# Patient Record
Sex: FEMALE | Race: WHITE | NOT HISPANIC OR LATINO | Employment: OTHER | URBAN - METROPOLITAN AREA
[De-identification: names, ages, dates, MRNs, and addresses within clinical notes are randomized per-mention and may not be internally consistent; named-entity substitution may affect disease eponyms.]

---

## 2023-03-07 DIAGNOSIS — G47.00 INSOMNIA, UNSPECIFIED TYPE: Primary | ICD-10-CM

## 2023-03-07 RX ORDER — FLUTICASONE PROPIONATE 50 MCG
2 SPRAY, SUSPENSION (ML) NASAL DAILY
COMMUNITY
Start: 2013-04-22 | End: 2023-04-10

## 2023-03-07 RX ORDER — ERGOCALCIFEROL 1.25 MG/1
50000 CAPSULE ORAL
COMMUNITY
Start: 2015-04-16 | End: 2023-07-26

## 2023-03-07 RX ORDER — LEVOTHYROXINE SODIUM 100 UG/1
100 TABLET ORAL DAILY
COMMUNITY
Start: 2013-10-31 | End: 2023-07-07 | Stop reason: SDUPTHER

## 2023-03-07 RX ORDER — ZOLPIDEM TARTRATE 10 MG/1
10 TABLET ORAL NIGHTLY PRN
Qty: 30 TABLET | Refills: 0 | Status: SHIPPED | OUTPATIENT
Start: 2023-03-07

## 2023-03-07 RX ORDER — METOPROLOL SUCCINATE 25 MG/1
25 TABLET, EXTENDED RELEASE ORAL
COMMUNITY
Start: 2022-08-05 | End: 2024-04-15

## 2023-03-07 RX ORDER — VALACYCLOVIR HYDROCHLORIDE 500 MG/1
500 TABLET, FILM COATED ORAL 2 TIMES DAILY
COMMUNITY
Start: 2017-08-29

## 2023-03-07 RX ORDER — PREDNISONE 2.5 MG/1
2.5 TABLET ORAL DAILY
COMMUNITY
Start: 2018-05-17

## 2023-03-07 RX ORDER — AZELASTINE 1 MG/ML
2 SPRAY, METERED NASAL 2 TIMES DAILY
COMMUNITY
Start: 2021-07-15 | End: 2023-03-14 | Stop reason: ALTCHOICE

## 2023-03-07 RX ORDER — PREDNISONE 1 MG/1
1 TABLET ORAL
COMMUNITY
Start: 2022-06-23 | End: 2023-07-07

## 2023-03-07 RX ORDER — ZOLPIDEM TARTRATE 10 MG/1
10 TABLET ORAL NIGHTLY PRN
COMMUNITY
Start: 2013-04-22 | End: 2023-03-07 | Stop reason: SDUPTHER

## 2023-03-07 RX ORDER — ALENDRONATE SODIUM 70 MG/1
70 TABLET ORAL
COMMUNITY
Start: 2017-09-11

## 2023-03-13 PROBLEM — Z87.820: Status: ACTIVE | Noted: 2023-03-13

## 2023-03-13 PROBLEM — H44.23 PATHOLOGIC MYOPIA, BILATERAL: Status: ACTIVE | Noted: 2023-03-13

## 2023-03-13 PROBLEM — J34.89 NASAL DRAINAGE: Status: ACTIVE | Noted: 2023-03-13

## 2023-03-13 PROBLEM — R09.82 POST-NASAL DRIP: Status: ACTIVE | Noted: 2023-03-13

## 2023-03-13 PROBLEM — M81.0 OSTEOPOROSIS WITHOUT CURRENT PATHOLOGICAL FRACTURE: Status: ACTIVE | Noted: 2023-03-13

## 2023-03-13 PROBLEM — H40.003 GLAUCOMA SUSPECT OF BOTH EYES: Status: ACTIVE | Noted: 2023-03-13

## 2023-03-13 PROBLEM — M54.2 NECK PAIN: Status: ACTIVE | Noted: 2023-03-13

## 2023-03-13 PROBLEM — E04.9 THYROID ENLARGEMENT: Status: ACTIVE | Noted: 2023-03-13

## 2023-03-13 PROBLEM — R33.9 URINARY RETENTION: Status: ACTIVE | Noted: 2023-03-13

## 2023-03-13 PROBLEM — R05.9 COUGH: Status: ACTIVE | Noted: 2023-03-13

## 2023-03-13 PROBLEM — H25.13 CATARACT, NUCLEAR SCLEROTIC, BOTH EYES: Status: ACTIVE | Noted: 2023-03-13

## 2023-03-13 PROBLEM — R05.3 CHRONIC COUGH: Status: ACTIVE | Noted: 2023-03-13

## 2023-03-13 PROBLEM — H17.9 LEFT CORNEA SCAR: Status: ACTIVE | Noted: 2023-03-13

## 2023-03-13 PROBLEM — R00.2 PALPITATIONS: Status: ACTIVE | Noted: 2023-03-13

## 2023-03-13 PROBLEM — D12.6 TUBULAR ADENOMA OF COLON: Status: ACTIVE | Noted: 2023-03-13

## 2023-03-13 PROBLEM — M25.69 STIFF BACK: Status: ACTIVE | Noted: 2023-03-13

## 2023-03-13 PROBLEM — K21.9 REFLUX LARYNGITIS: Status: ACTIVE | Noted: 2023-03-13

## 2023-03-13 PROBLEM — H18.603 KERATOCONUS OF BOTH EYES: Status: ACTIVE | Noted: 2023-03-13

## 2023-03-13 PROBLEM — U07.1 COVID-19: Status: ACTIVE | Noted: 2023-03-13

## 2023-03-13 PROBLEM — Z87.898: Status: ACTIVE | Noted: 2023-03-13

## 2023-03-13 PROBLEM — H93.19 TINNITUS: Status: ACTIVE | Noted: 2023-03-13

## 2023-03-13 PROBLEM — J31.0 CHRONIC RHINITIS: Status: ACTIVE | Noted: 2023-03-13

## 2023-03-13 PROBLEM — H52.13 MYOPIA OF BOTH EYES WITH ASTIGMATISM AND PRESBYOPIA: Status: ACTIVE | Noted: 2023-03-13

## 2023-03-13 PROBLEM — R09.A2 GLOBUS SENSATION: Status: ACTIVE | Noted: 2023-03-13

## 2023-03-13 PROBLEM — G47.00 INSOMNIA: Status: ACTIVE | Noted: 2023-03-13

## 2023-03-13 PROBLEM — R69 TRAVEL-RELATED ILLNESS: Status: ACTIVE | Noted: 2023-03-13

## 2023-03-13 PROBLEM — K21.9 LPRD (LARYNGOPHARYNGEAL REFLUX DISEASE): Status: ACTIVE | Noted: 2023-03-13

## 2023-03-13 PROBLEM — J38.00 VOCAL CORD WEAKNESS: Status: ACTIVE | Noted: 2023-03-13

## 2023-03-13 PROBLEM — I49.3 PVC (PREMATURE VENTRICULAR CONTRACTION): Status: ACTIVE | Noted: 2023-03-13

## 2023-03-13 PROBLEM — S89.92XA KNEE INJURY, LEFT, INITIAL ENCOUNTER: Status: ACTIVE | Noted: 2023-03-13

## 2023-03-13 PROBLEM — S39.012A BACK STRAIN: Status: ACTIVE | Noted: 2023-03-13

## 2023-03-13 PROBLEM — R79.89 LOW VITAMIN D LEVEL: Status: ACTIVE | Noted: 2023-03-13

## 2023-03-13 PROBLEM — H52.203 MYOPIA OF BOTH EYES WITH ASTIGMATISM AND PRESBYOPIA: Status: ACTIVE | Noted: 2023-03-13

## 2023-03-13 PROBLEM — R53.83 OTHER FATIGUE: Status: ACTIVE | Noted: 2023-03-13

## 2023-03-13 PROBLEM — J04.0 REFLUX LARYNGITIS: Status: ACTIVE | Noted: 2023-03-13

## 2023-03-13 PROBLEM — A09 TRAVELERS' DIARRHEA: Status: ACTIVE | Noted: 2023-03-13

## 2023-03-13 PROBLEM — I77.810 MILD ASCENDING AORTA DILATATION (CMS-HCC): Status: ACTIVE | Noted: 2023-03-13

## 2023-03-13 PROBLEM — M25.562 LEFT KNEE PAIN: Status: ACTIVE | Noted: 2023-03-13

## 2023-03-13 PROBLEM — R53.83 FATIGUE: Status: ACTIVE | Noted: 2023-03-13

## 2023-03-13 PROBLEM — R04.0 EPISTAXIS: Status: ACTIVE | Noted: 2023-03-13

## 2023-03-13 PROBLEM — J30.9 ALLERGIC RHINITIS: Status: ACTIVE | Noted: 2023-03-13

## 2023-03-13 PROBLEM — E66.3 OVERWEIGHT WITH BODY MASS INDEX (BMI) OF 25 TO 25.9 IN ADULT: Status: ACTIVE | Noted: 2023-03-13

## 2023-03-13 PROBLEM — E78.5 HYPERLIPIDEMIA: Status: ACTIVE | Noted: 2023-03-13

## 2023-03-13 PROBLEM — H52.4 MYOPIA OF BOTH EYES WITH ASTIGMATISM AND PRESBYOPIA: Status: ACTIVE | Noted: 2023-03-13

## 2023-03-13 PROBLEM — R42 DIZZINESS: Status: ACTIVE | Noted: 2023-03-13

## 2023-03-13 PROBLEM — S83.419A MEDIAL COLLATERAL LIGAMENT SPRAIN OF KNEE: Status: ACTIVE | Noted: 2023-03-13

## 2023-03-13 PROBLEM — M79.10 MYALGIA: Status: ACTIVE | Noted: 2023-03-13

## 2023-03-13 PROBLEM — R09.A2 GLOBUS PHARYNGEUS: Status: ACTIVE | Noted: 2023-03-13

## 2023-03-13 PROBLEM — M85.80 OSTEOPENIA: Status: ACTIVE | Noted: 2023-03-13

## 2023-03-13 PROBLEM — G47.09 OTHER INSOMNIA: Status: ACTIVE | Noted: 2023-03-13

## 2023-03-13 PROBLEM — E03.9 HYPOTHYROIDISM: Status: ACTIVE | Noted: 2023-03-13

## 2023-03-13 PROBLEM — L50.0: Status: ACTIVE | Noted: 2023-03-13

## 2023-03-13 PROBLEM — H52.213 IRREGULAR ASTIGMATISM OF BOTH EYES: Status: ACTIVE | Noted: 2023-03-13

## 2023-03-13 PROBLEM — M35.3 POLYMYALGIA RHEUMATICA (MULTI): Status: ACTIVE | Noted: 2023-03-13

## 2023-03-14 ENCOUNTER — TELEMEDICINE (OUTPATIENT)
Dept: PRIMARY CARE | Facility: CLINIC | Age: 68
End: 2023-03-14
Payer: COMMERCIAL

## 2023-03-14 DIAGNOSIS — G47.09 OTHER INSOMNIA: Primary | ICD-10-CM

## 2023-03-14 DIAGNOSIS — G47.00 INSOMNIA, UNSPECIFIED TYPE: ICD-10-CM

## 2023-03-14 DIAGNOSIS — M35.3 POLYMYALGIA RHEUMATICA (MULTI): ICD-10-CM

## 2023-03-14 PROCEDURE — 99213 OFFICE O/P EST LOW 20 MIN: CPT | Performed by: INTERNAL MEDICINE

## 2023-03-14 RX ORDER — ESZOPICLONE 2 MG/1
2 TABLET, FILM COATED ORAL NIGHTLY PRN
Qty: 30 TABLET | Refills: 3 | Status: SHIPPED | OUTPATIENT
Start: 2023-03-14 | End: 2023-10-03 | Stop reason: SDUPTHER

## 2023-03-14 ASSESSMENT — ENCOUNTER SYMPTOMS
FATIGUE: 0
SLEEP DISTURBANCE: 1
CHILLS: 0

## 2023-03-14 NOTE — PROGRESS NOTES
Subjective   Patient ID: Dinora Lawler is a 67 y.o. female.    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  She has been taking zolpidem for few years.  She has tried Lunesta in the past but her insurance stopped covering.  She still has insomnia and is willing to try Lunesta.  Medical history significant for PMR , insomnia, hypothyroidism, osteopenia.    Review of Systems   Constitutional:  Negative for chills and fatigue.   Psychiatric/Behavioral:  Positive for sleep disturbance.        Objective   Physical Exam  Appears well  Assessment/Plan   Diagnoses and all orders for this visit:  Other insomnia  Insomnia, unspecified type  Polymyalgia rheumatica (CMS/HCC)  Chronic insomnia-start Lunesta 2 mg every night  Monitor  Call office for any concerns

## 2023-04-08 DIAGNOSIS — J30.9 ALLERGIC RHINITIS, UNSPECIFIED: ICD-10-CM

## 2023-04-10 RX ORDER — FLUTICASONE PROPIONATE 50 MCG
SPRAY, SUSPENSION (ML) NASAL
Qty: 48 ML | Refills: 2 | Status: SHIPPED | OUTPATIENT
Start: 2023-04-10

## 2023-06-30 DIAGNOSIS — M35.3 POLYMYALGIA RHEUMATICA (MULTI): ICD-10-CM

## 2023-07-07 DIAGNOSIS — E03.8 OTHER SPECIFIED HYPOTHYROIDISM: Primary | ICD-10-CM

## 2023-07-07 RX ORDER — PREDNISONE 1 MG/1
TABLET ORAL
Qty: 270 TABLET | Refills: 0 | Status: SHIPPED | OUTPATIENT
Start: 2023-07-07 | End: 2023-10-03 | Stop reason: SDUPTHER

## 2023-07-10 RX ORDER — LEVOTHYROXINE SODIUM 100 UG/1
100 TABLET ORAL DAILY
Qty: 30 TABLET | Refills: 1 | Status: SHIPPED | OUTPATIENT
Start: 2023-07-10 | End: 2023-08-09

## 2023-07-25 DIAGNOSIS — Z00.00 PREVENTATIVE HEALTH CARE: Primary | ICD-10-CM

## 2023-07-26 RX ORDER — ERGOCALCIFEROL 1.25 MG/1
1 CAPSULE ORAL
Qty: 12 CAPSULE | Refills: 2 | Status: SHIPPED | OUTPATIENT
Start: 2023-07-26

## 2023-07-31 ENCOUNTER — TELEPHONE (OUTPATIENT)
Dept: PRIMARY CARE | Facility: CLINIC | Age: 68
End: 2023-07-31
Payer: COMMERCIAL

## 2023-07-31 DIAGNOSIS — M35.3 POLYMYALGIA RHEUMATICA (MULTI): Primary | ICD-10-CM

## 2023-08-03 ENCOUNTER — LAB (OUTPATIENT)
Dept: LAB | Facility: LAB | Age: 68
End: 2023-08-03
Payer: COMMERCIAL

## 2023-08-03 ENCOUNTER — OFFICE VISIT (OUTPATIENT)
Dept: PRIMARY CARE | Facility: CLINIC | Age: 68
End: 2023-08-03
Payer: COMMERCIAL

## 2023-08-03 VITALS
HEIGHT: 68 IN | SYSTOLIC BLOOD PRESSURE: 104 MMHG | BODY MASS INDEX: 25.61 KG/M2 | WEIGHT: 169 LBS | DIASTOLIC BLOOD PRESSURE: 73 MMHG | TEMPERATURE: 97.2 F

## 2023-08-03 DIAGNOSIS — M35.3 POLYMYALGIA RHEUMATICA (MULTI): ICD-10-CM

## 2023-08-03 DIAGNOSIS — E03.8 OTHER SPECIFIED HYPOTHYROIDISM: ICD-10-CM

## 2023-08-03 DIAGNOSIS — E78.49 OTHER HYPERLIPIDEMIA: ICD-10-CM

## 2023-08-03 DIAGNOSIS — M81.8 OTHER OSTEOPOROSIS WITHOUT CURRENT PATHOLOGICAL FRACTURE: ICD-10-CM

## 2023-08-03 DIAGNOSIS — G47.00 INSOMNIA, UNSPECIFIED TYPE: ICD-10-CM

## 2023-08-03 DIAGNOSIS — Z00.00 ANNUAL PHYSICAL EXAM: Primary | ICD-10-CM

## 2023-08-03 DIAGNOSIS — E08.00 DIABETES MELLITUS DUE TO UNDERLYING CONDITION WITH HYPEROSMOLARITY WITHOUT COMA, WITHOUT LONG-TERM CURRENT USE OF INSULIN (MULTI): ICD-10-CM

## 2023-08-03 PROBLEM — S39.012A BACK STRAIN: Status: RESOLVED | Noted: 2023-03-13 | Resolved: 2023-08-03

## 2023-08-03 PROBLEM — A09 TRAVELERS' DIARRHEA: Status: RESOLVED | Noted: 2023-03-13 | Resolved: 2023-08-03

## 2023-08-03 PROBLEM — R09.82 POST-NASAL DRIP: Status: RESOLVED | Noted: 2023-03-13 | Resolved: 2023-08-03

## 2023-08-03 PROBLEM — U07.1 COVID-19: Status: RESOLVED | Noted: 2023-03-13 | Resolved: 2023-08-03

## 2023-08-03 PROBLEM — R04.0 EPISTAXIS: Status: RESOLVED | Noted: 2023-03-13 | Resolved: 2023-08-03

## 2023-08-03 LAB
ALANINE AMINOTRANSFERASE (SGPT) (U/L) IN SER/PLAS: 12 U/L (ref 7–45)
ALBUMIN (G/DL) IN SER/PLAS: 4.4 G/DL (ref 3.4–5)
ALKALINE PHOSPHATASE (U/L) IN SER/PLAS: 76 U/L (ref 33–136)
ANION GAP IN SER/PLAS: 13 MMOL/L (ref 10–20)
ASPARTATE AMINOTRANSFERASE (SGOT) (U/L) IN SER/PLAS: 17 U/L (ref 9–39)
BILIRUBIN TOTAL (MG/DL) IN SER/PLAS: 0.5 MG/DL (ref 0–1.2)
CALCIUM (MG/DL) IN SER/PLAS: 9.6 MG/DL (ref 8.6–10.6)
CARBON DIOXIDE, TOTAL (MMOL/L) IN SER/PLAS: 28 MMOL/L (ref 21–32)
CHLORIDE (MMOL/L) IN SER/PLAS: 104 MMOL/L (ref 98–107)
CHOLESTEROL (MG/DL) IN SER/PLAS: 214 MG/DL (ref 0–199)
CHOLESTEROL IN HDL (MG/DL) IN SER/PLAS: 65.8 MG/DL
CHOLESTEROL/HDL RATIO: 3.3
CREATININE (MG/DL) IN SER/PLAS: 0.57 MG/DL (ref 0.5–1.05)
ERYTHROCYTE DISTRIBUTION WIDTH (RATIO) BY AUTOMATED COUNT: 13.7 % (ref 11.5–14.5)
ERYTHROCYTE MEAN CORPUSCULAR HEMOGLOBIN CONCENTRATION (G/DL) BY AUTOMATED: 31.4 G/DL (ref 32–36)
ERYTHROCYTE MEAN CORPUSCULAR VOLUME (FL) BY AUTOMATED COUNT: 94 FL (ref 80–100)
ERYTHROCYTES (10*6/UL) IN BLOOD BY AUTOMATED COUNT: 4.28 X10E12/L (ref 4–5.2)
GFR FEMALE: >90 ML/MIN/1.73M2
GLUCOSE (MG/DL) IN SER/PLAS: 84 MG/DL (ref 74–99)
HEMATOCRIT (%) IN BLOOD BY AUTOMATED COUNT: 40.4 % (ref 36–46)
HEMOGLOBIN (G/DL) IN BLOOD: 12.7 G/DL (ref 12–16)
LDL: 130 MG/DL (ref 0–99)
LEUKOCYTES (10*3/UL) IN BLOOD BY AUTOMATED COUNT: 5.2 X10E9/L (ref 4.4–11.3)
NRBC (PER 100 WBCS) BY AUTOMATED COUNT: 0 /100 WBC (ref 0–0)
PLATELETS (10*3/UL) IN BLOOD AUTOMATED COUNT: 292 X10E9/L (ref 150–450)
POTASSIUM (MMOL/L) IN SER/PLAS: 4.3 MMOL/L (ref 3.5–5.3)
PROTEIN TOTAL: 7.2 G/DL (ref 6.4–8.2)
SODIUM (MMOL/L) IN SER/PLAS: 141 MMOL/L (ref 136–145)
THYROTROPIN (MIU/L) IN SER/PLAS BY DETECTION LIMIT <= 0.05 MIU/L: 2.08 MIU/L (ref 0.44–3.98)
TRIGLYCERIDE (MG/DL) IN SER/PLAS: 90 MG/DL (ref 0–149)
UREA NITROGEN (MG/DL) IN SER/PLAS: 13 MG/DL (ref 6–23)
VLDL: 18 MG/DL (ref 0–40)

## 2023-08-03 PROCEDURE — 99397 PER PM REEVAL EST PAT 65+ YR: CPT | Performed by: INTERNAL MEDICINE

## 2023-08-03 PROCEDURE — 80053 COMPREHEN METABOLIC PANEL: CPT

## 2023-08-03 PROCEDURE — 1159F MED LIST DOCD IN RCRD: CPT | Performed by: INTERNAL MEDICINE

## 2023-08-03 PROCEDURE — 3078F DIAST BP <80 MM HG: CPT | Performed by: INTERNAL MEDICINE

## 2023-08-03 PROCEDURE — 1036F TOBACCO NON-USER: CPT | Performed by: INTERNAL MEDICINE

## 2023-08-03 PROCEDURE — 1160F RVW MEDS BY RX/DR IN RCRD: CPT | Performed by: INTERNAL MEDICINE

## 2023-08-03 PROCEDURE — 85027 COMPLETE CBC AUTOMATED: CPT

## 2023-08-03 PROCEDURE — 36415 COLL VENOUS BLD VENIPUNCTURE: CPT

## 2023-08-03 PROCEDURE — 84443 ASSAY THYROID STIM HORMONE: CPT

## 2023-08-03 PROCEDURE — 3074F SYST BP LT 130 MM HG: CPT | Performed by: INTERNAL MEDICINE

## 2023-08-03 PROCEDURE — 80061 LIPID PANEL: CPT

## 2023-08-03 PROCEDURE — 1125F AMNT PAIN NOTED PAIN PRSNT: CPT | Performed by: INTERNAL MEDICINE

## 2023-08-03 ASSESSMENT — ENCOUNTER SYMPTOMS
WHEEZING: 0
DECREASED CONCENTRATION: 0
CHEST TIGHTNESS: 0
SHORTNESS OF BREATH: 0
SORE THROAT: 0
FLANK PAIN: 0
PALPITATIONS: 0
UNEXPECTED WEIGHT CHANGE: 0
NECK PAIN: 0
COUGH: 0
DEPRESSION: 0
ARTHRALGIAS: 0
NERVOUS/ANXIOUS: 0
OCCASIONAL FEELINGS OF UNSTEADINESS: 0
LOSS OF SENSATION IN FEET: 0
APPETITE CHANGE: 0
LIGHT-HEADEDNESS: 0
WEAKNESS: 0
HEADACHES: 0
SLEEP DISTURBANCE: 0
ABDOMINAL PAIN: 0
DYSURIA: 0
FREQUENCY: 0
CHILLS: 0
BACK PAIN: 0
DIFFICULTY URINATING: 0
ACTIVITY CHANGE: 0
BLOOD IN STOOL: 0
DIZZINESS: 0

## 2023-08-03 NOTE — PROGRESS NOTES
"Subjective   Patient ID: Dinora Lawler is a 68 y.o. female.    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  Her medical history is significant for hypothyroidism, hyperlipidemia, mild ascending aorta dilatation-followed by cardiology, history of PVC, polymyalgia rheumatica-on maintenance prednisone, chronic left knee pain and insomnia.  She is currently taking Lunesta for her sleep which helps her to some extent.  She has appointment with cardiology next month and has scheduled her CT angiogram to monitor aortic dilatation.  She denies any chest pain or shortness of breath.  Mammogram-2021  Colonoscopy 2020  DEXA-2020    Review of Systems   Constitutional:  Negative for activity change, appetite change, chills and unexpected weight change.   HENT:  Negative for congestion, postnasal drip and sore throat.    Eyes:  Negative for visual disturbance.   Respiratory:  Negative for cough, chest tightness, shortness of breath and wheezing.    Cardiovascular:  Negative for chest pain, palpitations and leg swelling.   Gastrointestinal:  Negative for abdominal pain and blood in stool.   Endocrine: Negative for cold intolerance and heat intolerance.   Genitourinary:  Negative for difficulty urinating, dysuria, flank pain and frequency.   Musculoskeletal:  Negative for arthralgias, back pain, gait problem and neck pain.        Occ left knee pain   Skin:  Negative for rash.   Allergic/Immunologic: Negative for environmental allergies and food allergies.   Neurological:  Negative for dizziness, weakness, light-headedness and headaches.   Psychiatric/Behavioral:  Negative for decreased concentration and sleep disturbance. The patient is not nervous/anxious.        Objective   Visit Vitals  /73   Temp 36.2 °C (97.2 °F)   Ht 1.727 m (5' 8\")   Wt 76.7 kg (169 lb)   BMI 25.70 kg/m²   Smoking Status Never   BSA 1.92 m²      Physical Exam  Vitals reviewed.   Constitutional:       General: She is not in acute distress.     " Appearance: Normal appearance.   HENT:      Head: Normocephalic and atraumatic.      Mouth/Throat:      Mouth: Mucous membranes are moist.   Eyes:      Extraocular Movements: Extraocular movements intact.      Conjunctiva/sclera: Conjunctivae normal.      Pupils: Pupils are equal, round, and reactive to light.   Cardiovascular:      Rate and Rhythm: Normal rate and regular rhythm.      Pulses: Normal pulses.   Pulmonary:      Effort: Pulmonary effort is normal. No respiratory distress.      Breath sounds: Normal breath sounds.   Abdominal:      General: Bowel sounds are normal. There is no distension.      Tenderness: There is no abdominal tenderness.   Musculoskeletal:         General: No swelling or tenderness. Normal range of motion.      Cervical back: Normal range of motion.   Skin:     General: Skin is warm.   Neurological:      General: No focal deficit present.      Mental Status: She is alert.      Coordination: Coordination normal.      Gait: Gait normal.   Psychiatric:         Mood and Affect: Mood normal.         Behavior: Behavior normal.         Assessment/Plan   Diagnoses and all orders for this visit:  Annual physical exam  Comments:  Physical exam completed  Blood work ordered  Mammogram ordered  Insomnia, unspecified type  Comments:  Continue with Lunesta  Diabetes mellitus due to underlying condition with hyperosmolarity without coma, without long-term current use of insulin (CMS/HCC)  -     BI mammo bilateral screening tomosynthesis; Future  Other hyperlipidemia  Comments:  Check lipid panel  Polymyalgia rheumatica (CMS/HCC)  Comments:  Continue with prednisone 3 mg daily  Follow-up with rheumatology as scheduled  Other specified hypothyroidism  Comments:  c/w Levothyroxine-5 days a week  Check TSH  Other osteoporosis without current pathological fracture  Comments:  Continue with Fosamax  Continue with weightbearing exercises

## 2023-08-04 DIAGNOSIS — E03.8 OTHER SPECIFIED HYPOTHYROIDISM: ICD-10-CM

## 2023-08-09 RX ORDER — LEVOTHYROXINE SODIUM 100 UG/1
100 TABLET ORAL DAILY
Qty: 90 TABLET | Refills: 3 | Status: SHIPPED | OUTPATIENT
Start: 2023-08-09

## 2023-10-03 DIAGNOSIS — M35.3 POLYMYALGIA RHEUMATICA (MULTI): ICD-10-CM

## 2023-10-03 DIAGNOSIS — G47.00 INSOMNIA, UNSPECIFIED TYPE: ICD-10-CM

## 2023-10-03 RX ORDER — PREDNISONE 1 MG/1
3 TABLET ORAL DAILY
Qty: 270 TABLET | Refills: 0 | Status: SHIPPED | OUTPATIENT
Start: 2023-10-03 | End: 2024-01-07

## 2023-10-03 RX ORDER — ESZOPICLONE 2 MG/1
2 TABLET, FILM COATED ORAL NIGHTLY PRN
Qty: 30 TABLET | Refills: 3 | Status: SHIPPED | OUTPATIENT
Start: 2023-10-03 | End: 2024-04-25

## 2023-10-23 DIAGNOSIS — M35.3 POLYMYALGIA RHEUMATICA (MULTI): Primary | ICD-10-CM

## 2023-10-23 RX ORDER — PREDNISONE 5 MG/1
5 TABLET ORAL DAILY
Qty: 21 TABLET | Refills: 8 | Status: SHIPPED | OUTPATIENT
Start: 2023-10-23 | End: 2024-04-20

## 2023-11-07 ENCOUNTER — ANCILLARY PROCEDURE (OUTPATIENT)
Dept: RADIOLOGY | Facility: CLINIC | Age: 68
End: 2023-11-07
Payer: COMMERCIAL

## 2023-11-07 DIAGNOSIS — E08.00 DIABETES MELLITUS DUE TO UNDERLYING CONDITION WITH HYPEROSMOLARITY WITHOUT COMA, WITHOUT LONG-TERM CURRENT USE OF INSULIN (MULTI): ICD-10-CM

## 2023-11-07 PROCEDURE — 77067 SCR MAMMO BI INCL CAD: CPT | Mod: BILATERAL PROCEDURE | Performed by: RADIOLOGY

## 2023-11-07 PROCEDURE — 77063 BREAST TOMOSYNTHESIS BI: CPT | Mod: BILATERAL PROCEDURE | Performed by: RADIOLOGY

## 2023-11-07 PROCEDURE — 77067 SCR MAMMO BI INCL CAD: CPT

## 2024-01-04 DIAGNOSIS — M35.3 POLYMYALGIA RHEUMATICA (MULTI): ICD-10-CM

## 2024-01-05 DIAGNOSIS — Z71.84 COUNSELING FOR TRAVEL: Primary | ICD-10-CM

## 2024-01-05 RX ORDER — LOPERAMIDE HYDROCHLORIDE 2 MG/1
CAPSULE ORAL
Qty: 12 CAPSULE | Refills: 0 | Status: SHIPPED | OUTPATIENT
Start: 2024-01-05

## 2024-01-05 RX ORDER — CIPROFLOXACIN 500 MG/1
500 TABLET ORAL 2 TIMES DAILY
Qty: 6 TABLET | Refills: 0 | Status: SHIPPED | OUTPATIENT
Start: 2024-01-05 | End: 2024-01-08

## 2024-01-05 RX ORDER — ATOVAQUONE AND PROGUANIL HYDROCHLORIDE 250; 100 MG/1; MG/1
1 TABLET, FILM COATED ORAL DAILY
Qty: 18 TABLET | Refills: 0 | Status: SHIPPED | OUTPATIENT
Start: 2024-01-05 | End: 2024-01-23

## 2024-01-07 RX ORDER — PREDNISONE 1 MG/1
TABLET ORAL
Qty: 270 TABLET | Refills: 0 | Status: SHIPPED | OUTPATIENT
Start: 2024-01-07 | End: 2024-04-15

## 2024-02-28 ENCOUNTER — OFFICE VISIT (OUTPATIENT)
Dept: OPHTHALMOLOGY | Facility: CLINIC | Age: 69
End: 2024-02-28
Payer: COMMERCIAL

## 2024-02-28 DIAGNOSIS — H40.003 GLAUCOMA SUSPECT OF BOTH EYES: Primary | ICD-10-CM

## 2024-02-28 DIAGNOSIS — H52.4 MYOPIA OF BOTH EYES WITH ASTIGMATISM AND PRESBYOPIA: ICD-10-CM

## 2024-02-28 DIAGNOSIS — H52.13 MYOPIA OF BOTH EYES WITH ASTIGMATISM AND PRESBYOPIA: ICD-10-CM

## 2024-02-28 DIAGNOSIS — H18.603 KERATOCONUS OF BOTH EYES: ICD-10-CM

## 2024-02-28 DIAGNOSIS — H52.203 MYOPIA OF BOTH EYES WITH ASTIGMATISM AND PRESBYOPIA: ICD-10-CM

## 2024-02-28 PROCEDURE — 92133 CPTRZD OPH DX IMG PST SGM ON: CPT | Performed by: OPTOMETRIST

## 2024-02-28 PROCEDURE — 92012 INTRM OPH EXAM EST PATIENT: CPT | Performed by: OPTOMETRIST

## 2024-02-28 ASSESSMENT — EXTERNAL EXAM - LEFT EYE: OS_EXAM: NORMAL

## 2024-02-28 ASSESSMENT — ENCOUNTER SYMPTOMS
ENDOCRINE NEGATIVE: 0
MUSCULOSKELETAL NEGATIVE: 0
EYES NEGATIVE: 1
HEMATOLOGIC/LYMPHATIC NEGATIVE: 0
NEUROLOGICAL NEGATIVE: 0
CONSTITUTIONAL NEGATIVE: 0
GASTROINTESTINAL NEGATIVE: 0
RESPIRATORY NEGATIVE: 0
ALLERGIC/IMMUNOLOGIC NEGATIVE: 0
CARDIOVASCULAR NEGATIVE: 0
PSYCHIATRIC NEGATIVE: 0

## 2024-02-28 ASSESSMENT — EXTERNAL EXAM - RIGHT EYE: OD_EXAM: NORMAL

## 2024-02-28 ASSESSMENT — CUP TO DISC RATIO
OD_RATIO: .8
OS_RATIO: .7

## 2024-02-28 ASSESSMENT — TONOMETRY
OS_IOP_MMHG: 15
IOP_METHOD: GOLDMANN APPLANATION
OD_IOP_MMHG: 17

## 2024-02-28 ASSESSMENT — VISUAL ACUITY
OS_CC: 20/50
METHOD: SNELLEN - LINEAR
OD_CC: 20/25
OS_PH_CC: 20/40

## 2024-02-28 ASSESSMENT — SLIT LAMP EXAM - LIDS: COMMENTS: GOOD POSITION

## 2024-02-28 NOTE — PROGRESS NOTES
GLC suspect with OD stable and OS ? reduction in RNFL.    Optical coherence tomography of the retinal nerve fiber layer (RNFL) revealed:    OD: Reduced thickness in S and I sectors with an average RNFL thickness of unable was 59 was 55 micron. was 57   OS: Borderline thickness in S sectors with an average RNFL thickness of unable was 77 (may have error) was 83 micron was 103    IOP 17/15 IOP is excellent but skewed by thinner cornea.       VA cc 20/60 OD, 20/40 OS.  KCN and new RGP ordered.  Needs PA and ABN done.  send to patient.  VA cRGP 20/25 OD and 20/30 OS.  Is excellent and stable.     Mild cataracts and will monitor.     A spectacle prescription was given at the patient`s request.  May not fill.   Trichiasis LLL and epilated 3 lashes in the past.      DFE completed. A spectacle prescription was dispensed to be used as needed. A contact lens prescription was dispensed at the patient`s request.     Optos completed. Retinal multimodal imaging including photography was completed, and the findings are described in the examination.     RTC 6 months manifest refraction (MR) CL check DFE OCT RNFL Optos and consider epilation of lashes LLL and IOP check.

## 2024-04-15 DIAGNOSIS — I77.810 THORACIC AORTIC ECTASIA (CMS-HCC): ICD-10-CM

## 2024-04-15 DIAGNOSIS — M35.3 POLYMYALGIA RHEUMATICA (MULTI): ICD-10-CM

## 2024-04-15 RX ORDER — METOPROLOL SUCCINATE 25 MG/1
25 TABLET, EXTENDED RELEASE ORAL DAILY
Qty: 90 TABLET | Refills: 3 | Status: SHIPPED | OUTPATIENT
Start: 2024-04-15

## 2024-04-15 RX ORDER — PREDNISONE 1 MG/1
3 TABLET ORAL DAILY
Qty: 60 TABLET | Refills: 0 | Status: SHIPPED | OUTPATIENT
Start: 2024-04-15 | End: 2024-05-07 | Stop reason: SDUPTHER

## 2024-04-24 DIAGNOSIS — G47.00 INSOMNIA, UNSPECIFIED TYPE: ICD-10-CM

## 2024-04-25 RX ORDER — ESZOPICLONE 2 MG/1
2 TABLET, FILM COATED ORAL NIGHTLY PRN
Qty: 30 TABLET | Refills: 0 | Status: SHIPPED | OUTPATIENT
Start: 2024-04-25 | End: 2024-06-24

## 2024-05-07 DIAGNOSIS — M35.3 POLYMYALGIA RHEUMATICA (MULTI): ICD-10-CM

## 2024-05-07 RX ORDER — PREDNISONE 1 MG/1
3 TABLET ORAL DAILY
Qty: 270 TABLET | Refills: 1 | Status: SHIPPED | OUTPATIENT
Start: 2024-05-07

## 2024-06-20 ENCOUNTER — PATIENT MESSAGE (OUTPATIENT)
Dept: CARDIOLOGY | Facility: CLINIC | Age: 69
End: 2024-06-20
Payer: COMMERCIAL

## 2024-06-20 DIAGNOSIS — I71.21 ANEURYSM OF ASCENDING AORTA WITHOUT RUPTURE (CMS-HCC): ICD-10-CM

## 2024-06-20 DIAGNOSIS — I77.810 MILD ASCENDING AORTA DILATATION (CMS-HCC): Primary | ICD-10-CM

## 2024-07-08 DIAGNOSIS — G47.00 INSOMNIA, UNSPECIFIED TYPE: ICD-10-CM

## 2024-07-08 RX ORDER — ESZOPICLONE 2 MG/1
2 TABLET, FILM COATED ORAL NIGHTLY PRN
Qty: 30 TABLET | Refills: 0 | Status: SHIPPED | OUTPATIENT
Start: 2024-07-08 | End: 2024-09-06

## 2024-07-09 DIAGNOSIS — E78.49 OTHER HYPERLIPIDEMIA: Primary | ICD-10-CM

## 2024-07-09 DIAGNOSIS — F41.9 ANXIETY: ICD-10-CM

## 2024-07-09 DIAGNOSIS — F40.240 CLAUSTROPHOBIA: ICD-10-CM

## 2024-07-09 RX ORDER — LORAZEPAM 1 MG/1
1 TABLET ORAL EVERY 6 HOURS PRN
Qty: 2 TABLET | Refills: 0 | Status: SHIPPED | OUTPATIENT
Start: 2024-07-09 | End: 2024-07-16

## 2024-07-09 NOTE — PROGRESS NOTES
Patient called and left message that she needs bloodwork ordered.  She is going to get MRI for which she would require to take Ativan. Ativan prescription called in. Patient will be seen for appointment in a month.

## 2024-07-16 ENCOUNTER — LAB (OUTPATIENT)
Dept: LAB | Facility: LAB | Age: 69
End: 2024-07-16
Payer: COMMERCIAL

## 2024-07-16 DIAGNOSIS — I77.810 MILD ASCENDING AORTA DILATATION (CMS-HCC): ICD-10-CM

## 2024-07-16 DIAGNOSIS — E78.49 OTHER HYPERLIPIDEMIA: ICD-10-CM

## 2024-07-16 LAB
ALBUMIN SERPL BCP-MCNC: 4.1 G/DL (ref 3.4–5)
ALP SERPL-CCNC: 79 U/L (ref 33–136)
ALT SERPL W P-5'-P-CCNC: 10 U/L (ref 7–45)
ANION GAP SERPL CALC-SCNC: 12 MMOL/L (ref 10–20)
AST SERPL W P-5'-P-CCNC: 15 U/L (ref 9–39)
BILIRUB SERPL-MCNC: 0.6 MG/DL (ref 0–1.2)
BUN SERPL-MCNC: 14 MG/DL (ref 6–23)
CALCIUM SERPL-MCNC: 9.5 MG/DL (ref 8.6–10.6)
CHLORIDE SERPL-SCNC: 104 MMOL/L (ref 98–107)
CHOLEST SERPL-MCNC: 211 MG/DL (ref 0–199)
CHOLESTEROL/HDL RATIO: 3.2
CO2 SERPL-SCNC: 29 MMOL/L (ref 21–32)
CREAT SERPL-MCNC: 0.64 MG/DL (ref 0.5–1.05)
EGFRCR SERPLBLD CKD-EPI 2021: >90 ML/MIN/1.73M*2
ERYTHROCYTE [DISTWIDTH] IN BLOOD BY AUTOMATED COUNT: 13.8 % (ref 11.5–14.5)
GLUCOSE SERPL-MCNC: 91 MG/DL (ref 74–99)
HCT VFR BLD AUTO: 40.7 % (ref 36–46)
HCV AB SER QL: NONREACTIVE
HDLC SERPL-MCNC: 65.6 MG/DL
HGB BLD-MCNC: 12.5 G/DL (ref 12–16)
LDLC SERPL CALC-MCNC: 124 MG/DL
MCH RBC QN AUTO: 28.9 PG (ref 26–34)
MCHC RBC AUTO-ENTMCNC: 30.7 G/DL (ref 32–36)
MCV RBC AUTO: 94 FL (ref 80–100)
NON HDL CHOLESTEROL: 145 MG/DL (ref 0–149)
NRBC BLD-RTO: 0 /100 WBCS (ref 0–0)
PLATELET # BLD AUTO: 312 X10*3/UL (ref 150–450)
POTASSIUM SERPL-SCNC: 4.3 MMOL/L (ref 3.5–5.3)
PROT SERPL-MCNC: 7 G/DL (ref 6.4–8.2)
RBC # BLD AUTO: 4.33 X10*6/UL (ref 4–5.2)
SODIUM SERPL-SCNC: 141 MMOL/L (ref 136–145)
TRIGL SERPL-MCNC: 105 MG/DL (ref 0–149)
TSH SERPL-ACNC: 3.68 MIU/L (ref 0.44–3.98)
VLDL: 21 MG/DL (ref 0–40)
WBC # BLD AUTO: 7.6 X10*3/UL (ref 4.4–11.3)

## 2024-07-16 PROCEDURE — 80053 COMPREHEN METABOLIC PANEL: CPT

## 2024-07-16 PROCEDURE — 84443 ASSAY THYROID STIM HORMONE: CPT

## 2024-07-16 PROCEDURE — 80061 LIPID PANEL: CPT

## 2024-07-16 PROCEDURE — 86803 HEPATITIS C AB TEST: CPT

## 2024-07-16 PROCEDURE — 85027 COMPLETE CBC AUTOMATED: CPT

## 2024-07-16 PROCEDURE — 36415 COLL VENOUS BLD VENIPUNCTURE: CPT

## 2024-08-06 ENCOUNTER — HOSPITAL ENCOUNTER (OUTPATIENT)
Dept: RADIOLOGY | Facility: HOSPITAL | Age: 69
Discharge: HOME | End: 2024-08-06
Payer: COMMERCIAL

## 2024-08-06 ENCOUNTER — APPOINTMENT (OUTPATIENT)
Dept: RADIOLOGY | Facility: HOSPITAL | Age: 69
DRG: 398 | End: 2024-08-06
Payer: COMMERCIAL

## 2024-08-06 ENCOUNTER — APPOINTMENT (OUTPATIENT)
Dept: CARDIOLOGY | Facility: HOSPITAL | Age: 69
DRG: 398 | End: 2024-08-06
Payer: COMMERCIAL

## 2024-08-06 ENCOUNTER — HOSPITAL ENCOUNTER (INPATIENT)
Facility: HOSPITAL | Age: 69
LOS: 4 days | Discharge: HOME | End: 2024-08-11
Attending: STUDENT IN AN ORGANIZED HEALTH CARE EDUCATION/TRAINING PROGRAM | Admitting: INTERNAL MEDICINE
Payer: COMMERCIAL

## 2024-08-06 DIAGNOSIS — I71.21 ANEURYSM OF ASCENDING AORTA WITHOUT RUPTURE (CMS-HCC): ICD-10-CM

## 2024-08-06 DIAGNOSIS — K35.32 RUPTURED APPENDICITIS: ICD-10-CM

## 2024-08-06 DIAGNOSIS — R10.31 RIGHT LOWER QUADRANT ABDOMINAL PAIN: Primary | ICD-10-CM

## 2024-08-06 LAB
ALBUMIN SERPL BCP-MCNC: 4 G/DL (ref 3.4–5)
ALP SERPL-CCNC: 84 U/L (ref 33–136)
ALT SERPL W P-5'-P-CCNC: 10 U/L (ref 7–45)
ANION GAP SERPL CALC-SCNC: 13 MMOL/L (ref 10–20)
APPEARANCE UR: CLEAR
AST SERPL W P-5'-P-CCNC: 12 U/L (ref 9–39)
BASOPHILS # BLD AUTO: 0.04 X10*3/UL (ref 0–0.1)
BASOPHILS NFR BLD AUTO: 0.3 %
BILIRUB SERPL-MCNC: 0.8 MG/DL (ref 0–1.2)
BILIRUB UR STRIP.AUTO-MCNC: NEGATIVE MG/DL
BUN SERPL-MCNC: 9 MG/DL (ref 6–23)
CALCIUM SERPL-MCNC: 9.2 MG/DL (ref 8.6–10.3)
CHLORIDE SERPL-SCNC: 98 MMOL/L (ref 98–107)
CO2 SERPL-SCNC: 27 MMOL/L (ref 21–32)
COLOR UR: COLORLESS
CREAT SERPL-MCNC: 0.64 MG/DL (ref 0.5–1.05)
EGFRCR SERPLBLD CKD-EPI 2021: >90 ML/MIN/1.73M*2
EOSINOPHIL # BLD AUTO: 0.05 X10*3/UL (ref 0–0.7)
EOSINOPHIL NFR BLD AUTO: 0.4 %
ERYTHROCYTE [DISTWIDTH] IN BLOOD BY AUTOMATED COUNT: 12.8 % (ref 11.5–14.5)
GLUCOSE SERPL-MCNC: 106 MG/DL (ref 74–99)
GLUCOSE UR STRIP.AUTO-MCNC: NORMAL MG/DL
HCT VFR BLD AUTO: 38.1 % (ref 36–46)
HGB BLD-MCNC: 12.4 G/DL (ref 12–16)
IMM GRANULOCYTES # BLD AUTO: 0.09 X10*3/UL (ref 0–0.7)
IMM GRANULOCYTES NFR BLD AUTO: 0.6 % (ref 0–0.9)
KETONES UR STRIP.AUTO-MCNC: NEGATIVE MG/DL
LACTATE SERPL-SCNC: 0.8 MMOL/L (ref 0.4–2)
LEUKOCYTE ESTERASE UR QL STRIP.AUTO: NEGATIVE
LIPASE SERPL-CCNC: 9 U/L (ref 9–82)
LYMPHOCYTES # BLD AUTO: 1.24 X10*3/UL (ref 1.2–4.8)
LYMPHOCYTES NFR BLD AUTO: 8.7 %
MAGNESIUM SERPL-MCNC: 2 MG/DL (ref 1.6–2.4)
MCH RBC QN AUTO: 29.1 PG (ref 26–34)
MCHC RBC AUTO-ENTMCNC: 32.5 G/DL (ref 32–36)
MCV RBC AUTO: 89 FL (ref 80–100)
MONOCYTES # BLD AUTO: 1.22 X10*3/UL (ref 0.1–1)
MONOCYTES NFR BLD AUTO: 8.5 %
NEUTROPHILS # BLD AUTO: 11.63 X10*3/UL (ref 1.2–7.7)
NEUTROPHILS NFR BLD AUTO: 81.5 %
NITRITE UR QL STRIP.AUTO: NEGATIVE
NRBC BLD-RTO: 0 /100 WBCS (ref 0–0)
PH UR STRIP.AUTO: 7 [PH]
PLATELET # BLD AUTO: 435 X10*3/UL (ref 150–450)
POTASSIUM SERPL-SCNC: 3.6 MMOL/L (ref 3.5–5.3)
PROT SERPL-MCNC: 7.7 G/DL (ref 6.4–8.2)
PROT UR STRIP.AUTO-MCNC: NEGATIVE MG/DL
RBC # BLD AUTO: 4.26 X10*6/UL (ref 4–5.2)
RBC # UR STRIP.AUTO: ABNORMAL /UL
RBC #/AREA URNS AUTO: NORMAL /HPF
SODIUM SERPL-SCNC: 134 MMOL/L (ref 136–145)
SP GR UR STRIP.AUTO: 1.03
UROBILINOGEN UR STRIP.AUTO-MCNC: NORMAL MG/DL
WBC # BLD AUTO: 14.3 X10*3/UL (ref 4.4–11.3)
WBC #/AREA URNS AUTO: NORMAL /HPF

## 2024-08-06 PROCEDURE — 2550000001 HC RX 255 CONTRASTS: Performed by: INTERNAL MEDICINE

## 2024-08-06 PROCEDURE — 36415 COLL VENOUS BLD VENIPUNCTURE: CPT | Performed by: INTERNAL MEDICINE

## 2024-08-06 PROCEDURE — 81001 URINALYSIS AUTO W/SCOPE: CPT | Performed by: INTERNAL MEDICINE

## 2024-08-06 PROCEDURE — 71555 MRI ANGIO CHEST W OR W/O DYE: CPT | Performed by: RADIOLOGY

## 2024-08-06 PROCEDURE — 99285 EMERGENCY DEPT VISIT HI MDM: CPT

## 2024-08-06 PROCEDURE — 83690 ASSAY OF LIPASE: CPT | Performed by: INTERNAL MEDICINE

## 2024-08-06 PROCEDURE — 74177 CT ABD & PELVIS W/CONTRAST: CPT | Performed by: RADIOLOGY

## 2024-08-06 PROCEDURE — 83605 ASSAY OF LACTIC ACID: CPT | Performed by: INTERNAL MEDICINE

## 2024-08-06 PROCEDURE — 83735 ASSAY OF MAGNESIUM: CPT | Performed by: INTERNAL MEDICINE

## 2024-08-06 PROCEDURE — A9575 INJ GADOTERATE MEGLUMI 0.1ML: HCPCS | Performed by: INTERNAL MEDICINE

## 2024-08-06 PROCEDURE — 93005 ELECTROCARDIOGRAM TRACING: CPT

## 2024-08-06 PROCEDURE — 85025 COMPLETE CBC W/AUTO DIFF WBC: CPT | Performed by: INTERNAL MEDICINE

## 2024-08-06 PROCEDURE — 75565 CARD MRI VELOC FLOW MAPPING: CPT

## 2024-08-06 PROCEDURE — 80053 COMPREHEN METABOLIC PANEL: CPT | Performed by: INTERNAL MEDICINE

## 2024-08-06 PROCEDURE — 74177 CT ABD & PELVIS W/CONTRAST: CPT

## 2024-08-06 RX ORDER — GADOTERATE MEGLUMINE 376.9 MG/ML
30 INJECTION INTRAVENOUS
Status: COMPLETED | OUTPATIENT
Start: 2024-08-06 | End: 2024-08-06

## 2024-08-06 RX ADMIN — IOHEXOL 75 ML: 350 INJECTION, SOLUTION INTRAVENOUS at 22:08

## 2024-08-06 ASSESSMENT — PAIN DESCRIPTION - PAIN TYPE: TYPE: ACUTE PAIN

## 2024-08-06 ASSESSMENT — PAIN - FUNCTIONAL ASSESSMENT: PAIN_FUNCTIONAL_ASSESSMENT: 0-10

## 2024-08-06 ASSESSMENT — COLUMBIA-SUICIDE SEVERITY RATING SCALE - C-SSRS
1. IN THE PAST MONTH, HAVE YOU WISHED YOU WERE DEAD OR WISHED YOU COULD GO TO SLEEP AND NOT WAKE UP?: NO
6. HAVE YOU EVER DONE ANYTHING, STARTED TO DO ANYTHING, OR PREPARED TO DO ANYTHING TO END YOUR LIFE?: NO
2. HAVE YOU ACTUALLY HAD ANY THOUGHTS OF KILLING YOURSELF?: NO

## 2024-08-06 ASSESSMENT — PAIN DESCRIPTION - ONSET: ONSET: ONGOING

## 2024-08-06 ASSESSMENT — PAIN SCALES - GENERAL: PAINLEVEL_OUTOF10: 6

## 2024-08-06 ASSESSMENT — PAIN DESCRIPTION - FREQUENCY: FREQUENCY: CONSTANT/CONTINUOUS

## 2024-08-06 ASSESSMENT — PAIN DESCRIPTION - DESCRIPTORS: DESCRIPTORS: DULL;TENDER

## 2024-08-06 ASSESSMENT — PAIN DESCRIPTION - LOCATION: LOCATION: ABDOMEN

## 2024-08-06 ASSESSMENT — PAIN DESCRIPTION - ORIENTATION: ORIENTATION: LOWER;RIGHT

## 2024-08-07 ENCOUNTER — ANESTHESIA EVENT (OUTPATIENT)
Dept: OPERATING ROOM | Facility: HOSPITAL | Age: 69
End: 2024-08-07
Payer: COMMERCIAL

## 2024-08-07 ENCOUNTER — ANESTHESIA (OUTPATIENT)
Dept: OPERATING ROOM | Facility: HOSPITAL | Age: 69
End: 2024-08-07
Payer: COMMERCIAL

## 2024-08-07 PROBLEM — R10.31 RIGHT LOWER QUADRANT ABDOMINAL PAIN: Status: ACTIVE | Noted: 2024-08-07

## 2024-08-07 PROBLEM — K35.32 RUPTURED APPENDICITIS: Status: ACTIVE | Noted: 2024-08-06

## 2024-08-07 LAB
ANION GAP SERPL CALC-SCNC: 12 MMOL/L (ref 10–20)
BUN SERPL-MCNC: 8 MG/DL (ref 6–23)
CALCIUM SERPL-MCNC: 8.7 MG/DL (ref 8.6–10.3)
CHLORIDE SERPL-SCNC: 101 MMOL/L (ref 98–107)
CO2 SERPL-SCNC: 26 MMOL/L (ref 21–32)
CREAT SERPL-MCNC: 0.55 MG/DL (ref 0.5–1.05)
EGFRCR SERPLBLD CKD-EPI 2021: >90 ML/MIN/1.73M*2
ERYTHROCYTE [DISTWIDTH] IN BLOOD BY AUTOMATED COUNT: 12.8 % (ref 11.5–14.5)
GLUCOSE SERPL-MCNC: 91 MG/DL (ref 74–99)
HCT VFR BLD AUTO: 36.1 % (ref 36–46)
HGB BLD-MCNC: 12 G/DL (ref 12–16)
MCH RBC QN AUTO: 29.8 PG (ref 26–34)
MCHC RBC AUTO-ENTMCNC: 33.2 G/DL (ref 32–36)
MCV RBC AUTO: 90 FL (ref 80–100)
NRBC BLD-RTO: 0 /100 WBCS (ref 0–0)
PLATELET # BLD AUTO: 337 X10*3/UL (ref 150–450)
POTASSIUM SERPL-SCNC: 3.7 MMOL/L (ref 3.5–5.3)
RBC # BLD AUTO: 4.03 X10*6/UL (ref 4–5.2)
SODIUM SERPL-SCNC: 135 MMOL/L (ref 136–145)
WBC # BLD AUTO: 11.4 X10*3/UL (ref 4.4–11.3)

## 2024-08-07 PROCEDURE — 2500000005 HC RX 250 GENERAL PHARMACY W/O HCPCS: Performed by: SURGERY

## 2024-08-07 PROCEDURE — 2500000002 HC RX 250 W HCPCS SELF ADMINISTERED DRUGS (ALT 637 FOR MEDICARE OP, ALT 636 FOR OP/ED): Performed by: SURGERY

## 2024-08-07 PROCEDURE — 85027 COMPLETE CBC AUTOMATED: CPT | Performed by: INTERNAL MEDICINE

## 2024-08-07 PROCEDURE — 3600000004 HC OR TIME - INITIAL BASE CHARGE - PROCEDURE LEVEL FOUR: Performed by: SURGERY

## 2024-08-07 PROCEDURE — 80048 BASIC METABOLIC PNL TOTAL CA: CPT | Performed by: INTERNAL MEDICINE

## 2024-08-07 PROCEDURE — 2500000001 HC RX 250 WO HCPCS SELF ADMINISTERED DRUGS (ALT 637 FOR MEDICARE OP): Performed by: INTERNAL MEDICINE

## 2024-08-07 PROCEDURE — 0DTJ4ZZ RESECTION OF APPENDIX, PERCUTANEOUS ENDOSCOPIC APPROACH: ICD-10-PCS | Performed by: SURGERY

## 2024-08-07 PROCEDURE — C9113 INJ PANTOPRAZOLE SODIUM, VIA: HCPCS | Performed by: INTERNAL MEDICINE

## 2024-08-07 PROCEDURE — 2780000003 HC OR 278 NO HCPCS: Performed by: SURGERY

## 2024-08-07 PROCEDURE — 99222 1ST HOSP IP/OBS MODERATE 55: CPT | Performed by: INTERNAL MEDICINE

## 2024-08-07 PROCEDURE — 3700000001 HC GENERAL ANESTHESIA TIME - INITIAL BASE CHARGE: Performed by: SURGERY

## 2024-08-07 PROCEDURE — 99221 1ST HOSP IP/OBS SF/LOW 40: CPT | Performed by: SURGERY

## 2024-08-07 PROCEDURE — 2500000004 HC RX 250 GENERAL PHARMACY W/ HCPCS (ALT 636 FOR OP/ED): Performed by: INTERNAL MEDICINE

## 2024-08-07 PROCEDURE — 96365 THER/PROPH/DIAG IV INF INIT: CPT | Mod: 59

## 2024-08-07 PROCEDURE — 36415 COLL VENOUS BLD VENIPUNCTURE: CPT | Performed by: INTERNAL MEDICINE

## 2024-08-07 PROCEDURE — 2720000007 HC OR 272 NO HCPCS: Performed by: SURGERY

## 2024-08-07 PROCEDURE — 2500000004 HC RX 250 GENERAL PHARMACY W/ HCPCS (ALT 636 FOR OP/ED): Performed by: SURGERY

## 2024-08-07 PROCEDURE — 3600000009 HC OR TIME - EACH INCREMENTAL 1 MINUTE - PROCEDURE LEVEL FOUR: Performed by: SURGERY

## 2024-08-07 PROCEDURE — 2500000004 HC RX 250 GENERAL PHARMACY W/ HCPCS (ALT 636 FOR OP/ED): Performed by: STUDENT IN AN ORGANIZED HEALTH CARE EDUCATION/TRAINING PROGRAM

## 2024-08-07 PROCEDURE — 99221 1ST HOSP IP/OBS SF/LOW 40: CPT

## 2024-08-07 PROCEDURE — 2500000004 HC RX 250 GENERAL PHARMACY W/ HCPCS (ALT 636 FOR OP/ED): Performed by: ANESTHESIOLOGY

## 2024-08-07 PROCEDURE — A44970 PR LAP,APPENDECTOMY: Performed by: ANESTHESIOLOGY

## 2024-08-07 PROCEDURE — 2500000004 HC RX 250 GENERAL PHARMACY W/ HCPCS (ALT 636 FOR OP/ED)

## 2024-08-07 PROCEDURE — 3700000002 HC GENERAL ANESTHESIA TIME - EACH INCREMENTAL 1 MINUTE: Performed by: SURGERY

## 2024-08-07 PROCEDURE — 7100000002 HC RECOVERY ROOM TIME - EACH INCREMENTAL 1 MINUTE: Performed by: SURGERY

## 2024-08-07 PROCEDURE — A44970 PR LAP,APPENDECTOMY

## 2024-08-07 PROCEDURE — 7100000001 HC RECOVERY ROOM TIME - INITIAL BASE CHARGE: Performed by: SURGERY

## 2024-08-07 PROCEDURE — 96375 TX/PRO/DX INJ NEW DRUG ADDON: CPT

## 2024-08-07 PROCEDURE — 2500000005 HC RX 250 GENERAL PHARMACY W/O HCPCS: Performed by: ANESTHESIOLOGY

## 2024-08-07 PROCEDURE — 2500000005 HC RX 250 GENERAL PHARMACY W/O HCPCS

## 2024-08-07 PROCEDURE — 44970 LAPAROSCOPY APPENDECTOMY: CPT | Performed by: SURGERY

## 2024-08-07 PROCEDURE — 1200000002 HC GENERAL ROOM WITH TELEMETRY DAILY

## 2024-08-07 RX ORDER — ZOLPIDEM TARTRATE 5 MG/1
10 TABLET ORAL NIGHTLY PRN
Status: DISCONTINUED | OUTPATIENT
Start: 2024-08-07 | End: 2024-08-07

## 2024-08-07 RX ORDER — LIDOCAINE HYDROCHLORIDE 20 MG/ML
INJECTION, SOLUTION EPIDURAL; INFILTRATION; INTRACAUDAL; PERINEURAL AS NEEDED
Status: DISCONTINUED | OUTPATIENT
Start: 2024-08-07 | End: 2024-08-07

## 2024-08-07 RX ORDER — ROCURONIUM BROMIDE 10 MG/ML
INJECTION, SOLUTION INTRAVENOUS AS NEEDED
Status: DISCONTINUED | OUTPATIENT
Start: 2024-08-07 | End: 2024-08-07

## 2024-08-07 RX ORDER — ZOLPIDEM TARTRATE 5 MG/1
5 TABLET ORAL NIGHTLY
Status: DISCONTINUED | OUTPATIENT
Start: 2024-08-07 | End: 2024-08-11 | Stop reason: HOSPADM

## 2024-08-07 RX ORDER — ACETAMINOPHEN 325 MG/1
650 TABLET ORAL EVERY 4 HOURS PRN
Status: DISCONTINUED | OUTPATIENT
Start: 2024-08-07 | End: 2024-08-11 | Stop reason: HOSPADM

## 2024-08-07 RX ORDER — OXYCODONE HYDROCHLORIDE 5 MG/1
5 TABLET ORAL EVERY 4 HOURS PRN
Status: DISCONTINUED | OUTPATIENT
Start: 2024-08-07 | End: 2024-08-11 | Stop reason: HOSPADM

## 2024-08-07 RX ORDER — METOPROLOL SUCCINATE 25 MG/1
25 TABLET, EXTENDED RELEASE ORAL DAILY
Status: DISCONTINUED | OUTPATIENT
Start: 2024-08-07 | End: 2024-08-11 | Stop reason: HOSPADM

## 2024-08-07 RX ORDER — ONDANSETRON HYDROCHLORIDE 2 MG/ML
4 INJECTION, SOLUTION INTRAVENOUS EVERY 8 HOURS PRN
Status: DISCONTINUED | OUTPATIENT
Start: 2024-08-07 | End: 2024-08-11 | Stop reason: HOSPADM

## 2024-08-07 RX ORDER — PANTOPRAZOLE SODIUM 40 MG/1
40 TABLET, DELAYED RELEASE ORAL
Status: DISCONTINUED | OUTPATIENT
Start: 2024-08-07 | End: 2024-08-11 | Stop reason: HOSPADM

## 2024-08-07 RX ORDER — PANTOPRAZOLE SODIUM 40 MG/10ML
40 INJECTION, POWDER, LYOPHILIZED, FOR SOLUTION INTRAVENOUS
Status: DISCONTINUED | OUTPATIENT
Start: 2024-08-07 | End: 2024-08-11 | Stop reason: HOSPADM

## 2024-08-07 RX ORDER — SODIUM CHLORIDE 9 MG/ML
100 INJECTION, SOLUTION INTRAVENOUS CONTINUOUS
Status: ACTIVE | OUTPATIENT
Start: 2024-08-07 | End: 2024-08-07

## 2024-08-07 RX ORDER — ACETAMINOPHEN 650 MG/1
650 SUPPOSITORY RECTAL EVERY 4 HOURS PRN
Status: DISCONTINUED | OUTPATIENT
Start: 2024-08-07 | End: 2024-08-11 | Stop reason: HOSPADM

## 2024-08-07 RX ORDER — FENTANYL CITRATE 50 UG/ML
INJECTION, SOLUTION INTRAMUSCULAR; INTRAVENOUS AS NEEDED
Status: DISCONTINUED | OUTPATIENT
Start: 2024-08-07 | End: 2024-08-07

## 2024-08-07 RX ORDER — LORAZEPAM 1 MG/1
1 TABLET ORAL EVERY 6 HOURS PRN
Status: DISCONTINUED | OUTPATIENT
Start: 2024-08-07 | End: 2024-08-07

## 2024-08-07 RX ORDER — HYDROMORPHONE HYDROCHLORIDE 0.2 MG/ML
0.2 INJECTION INTRAMUSCULAR; INTRAVENOUS; SUBCUTANEOUS EVERY 4 HOURS PRN
Status: DISCONTINUED | OUTPATIENT
Start: 2024-08-07 | End: 2024-08-07

## 2024-08-07 RX ORDER — IPRATROPIUM BROMIDE 0.5 MG/2.5ML
500 SOLUTION RESPIRATORY (INHALATION) ONCE
Status: DISCONTINUED | OUTPATIENT
Start: 2024-08-07 | End: 2024-08-07 | Stop reason: HOSPADM

## 2024-08-07 RX ORDER — METRONIDAZOLE 500 MG/100ML
500 INJECTION, SOLUTION INTRAVENOUS ONCE
Status: COMPLETED | OUTPATIENT
Start: 2024-08-07 | End: 2024-08-07

## 2024-08-07 RX ORDER — ACETAMINOPHEN 325 MG/1
650 TABLET ORAL EVERY 4 HOURS PRN
Status: DISCONTINUED | OUTPATIENT
Start: 2024-08-07 | End: 2024-08-07 | Stop reason: HOSPADM

## 2024-08-07 RX ORDER — VALACYCLOVIR HYDROCHLORIDE 500 MG/1
500 TABLET, FILM COATED ORAL EVERY 12 HOURS PRN
Status: DISCONTINUED | OUTPATIENT
Start: 2024-08-07 | End: 2024-08-11 | Stop reason: HOSPADM

## 2024-08-07 RX ORDER — METRONIDAZOLE 500 MG/100ML
500 INJECTION, SOLUTION INTRAVENOUS ONCE
Status: DISCONTINUED | OUTPATIENT
Start: 2024-08-07 | End: 2024-08-07

## 2024-08-07 RX ORDER — FLUTICASONE PROPIONATE 50 MCG
2 SPRAY, SUSPENSION (ML) NASAL DAILY PRN
Status: DISCONTINUED | OUTPATIENT
Start: 2024-08-07 | End: 2024-08-11 | Stop reason: HOSPADM

## 2024-08-07 RX ORDER — OXYCODONE HYDROCHLORIDE 5 MG/1
5 TABLET ORAL EVERY 4 HOURS PRN
Status: DISCONTINUED | OUTPATIENT
Start: 2024-08-07 | End: 2024-08-07 | Stop reason: HOSPADM

## 2024-08-07 RX ORDER — PROPOFOL 10 MG/ML
INJECTION, EMULSION INTRAVENOUS AS NEEDED
Status: DISCONTINUED | OUTPATIENT
Start: 2024-08-07 | End: 2024-08-07

## 2024-08-07 RX ORDER — LEVOTHYROXINE SODIUM 100 UG/1
100 TABLET ORAL
Status: DISCONTINUED | OUTPATIENT
Start: 2024-08-08 | End: 2024-08-11 | Stop reason: HOSPADM

## 2024-08-07 RX ORDER — ONDANSETRON HYDROCHLORIDE 2 MG/ML
4 INJECTION, SOLUTION INTRAVENOUS ONCE AS NEEDED
Status: DISCONTINUED | OUTPATIENT
Start: 2024-08-07 | End: 2024-08-07 | Stop reason: HOSPADM

## 2024-08-07 RX ORDER — ONDANSETRON 4 MG/1
4 TABLET, FILM COATED ORAL EVERY 8 HOURS PRN
Status: DISCONTINUED | OUTPATIENT
Start: 2024-08-07 | End: 2024-08-11 | Stop reason: HOSPADM

## 2024-08-07 RX ORDER — MIDAZOLAM HYDROCHLORIDE 1 MG/ML
INJECTION INTRAMUSCULAR; INTRAVENOUS AS NEEDED
Status: DISCONTINUED | OUTPATIENT
Start: 2024-08-07 | End: 2024-08-07

## 2024-08-07 RX ORDER — OXYCODONE HYDROCHLORIDE 5 MG/1
10 TABLET ORAL EVERY 4 HOURS PRN
Status: DISCONTINUED | OUTPATIENT
Start: 2024-08-07 | End: 2024-08-11 | Stop reason: HOSPADM

## 2024-08-07 RX ORDER — CIPROFLOXACIN 2 MG/ML
400 INJECTION, SOLUTION INTRAVENOUS EVERY 12 HOURS
Status: DISCONTINUED | OUTPATIENT
Start: 2024-08-07 | End: 2024-08-11

## 2024-08-07 RX ORDER — DROPERIDOL 2.5 MG/ML
0.62 INJECTION, SOLUTION INTRAMUSCULAR; INTRAVENOUS ONCE AS NEEDED
Status: DISCONTINUED | OUTPATIENT
Start: 2024-08-07 | End: 2024-08-07 | Stop reason: HOSPADM

## 2024-08-07 RX ORDER — SODIUM CHLORIDE, SODIUM LACTATE, POTASSIUM CHLORIDE, CALCIUM CHLORIDE 600; 310; 30; 20 MG/100ML; MG/100ML; MG/100ML; MG/100ML
100 INJECTION, SOLUTION INTRAVENOUS CONTINUOUS
Status: DISCONTINUED | OUTPATIENT
Start: 2024-08-07 | End: 2024-08-07 | Stop reason: HOSPADM

## 2024-08-07 RX ORDER — ACETAMINOPHEN 160 MG/5ML
650 SOLUTION ORAL EVERY 4 HOURS PRN
Status: DISCONTINUED | OUTPATIENT
Start: 2024-08-07 | End: 2024-08-11 | Stop reason: HOSPADM

## 2024-08-07 RX ORDER — ENOXAPARIN SODIUM 100 MG/ML
40 INJECTION SUBCUTANEOUS EVERY 24 HOURS
Status: DISCONTINUED | OUTPATIENT
Start: 2024-08-07 | End: 2024-08-11 | Stop reason: HOSPADM

## 2024-08-07 RX ORDER — ONDANSETRON HYDROCHLORIDE 2 MG/ML
INJECTION, SOLUTION INTRAVENOUS AS NEEDED
Status: DISCONTINUED | OUTPATIENT
Start: 2024-08-07 | End: 2024-08-07

## 2024-08-07 RX ORDER — CLINDAMYCIN PHOSPHATE 150 MG/ML
INJECTION, SOLUTION INTRAVENOUS AS NEEDED
Status: DISCONTINUED | OUTPATIENT
Start: 2024-08-07 | End: 2024-08-07

## 2024-08-07 RX ORDER — LIDOCAINE HYDROCHLORIDE 10 MG/ML
0.1 INJECTION, SOLUTION EPIDURAL; INFILTRATION; INTRACAUDAL; PERINEURAL ONCE
Status: DISCONTINUED | OUTPATIENT
Start: 2024-08-07 | End: 2024-08-07 | Stop reason: HOSPADM

## 2024-08-07 RX ORDER — ALBUTEROL SULFATE 0.83 MG/ML
2.5 SOLUTION RESPIRATORY (INHALATION) ONCE AS NEEDED
Status: DISCONTINUED | OUTPATIENT
Start: 2024-08-07 | End: 2024-08-07 | Stop reason: HOSPADM

## 2024-08-07 RX ORDER — PHENYLEPHRINE HCL IN 0.9% NACL 1 MG/10 ML
SYRINGE (ML) INTRAVENOUS AS NEEDED
Status: DISCONTINUED | OUTPATIENT
Start: 2024-08-07 | End: 2024-08-07

## 2024-08-07 RX ORDER — SODIUM CHLORIDE, SODIUM LACTATE, POTASSIUM CHLORIDE, CALCIUM CHLORIDE 600; 310; 30; 20 MG/100ML; MG/100ML; MG/100ML; MG/100ML
100 INJECTION, SOLUTION INTRAVENOUS CONTINUOUS
Status: DISCONTINUED | OUTPATIENT
Start: 2024-08-07 | End: 2024-08-10

## 2024-08-07 RX ORDER — METRONIDAZOLE 500 MG/100ML
500 INJECTION, SOLUTION INTRAVENOUS EVERY 8 HOURS
Status: DISCONTINUED | OUTPATIENT
Start: 2024-08-07 | End: 2024-08-11

## 2024-08-07 RX ADMIN — METOPROLOL SUCCINATE 25 MG: 25 TABLET, EXTENDED RELEASE ORAL at 08:45

## 2024-08-07 RX ADMIN — HYDROMORPHONE HYDROCHLORIDE 0.4 MG: 1 INJECTION, SOLUTION INTRAMUSCULAR; INTRAVENOUS; SUBCUTANEOUS at 21:42

## 2024-08-07 RX ADMIN — METRONIDAZOLE 500 MG: 500 INJECTION, SOLUTION INTRAVENOUS at 08:45

## 2024-08-07 RX ADMIN — HYDROMORPHONE HYDROCHLORIDE 0.2 MG: 0.2 INJECTION, SOLUTION INTRAMUSCULAR; INTRAVENOUS; SUBCUTANEOUS at 08:42

## 2024-08-07 RX ADMIN — Medication 6 L/MIN: at 18:28

## 2024-08-07 RX ADMIN — CIPROFLOXACIN 400 MG: 400 INJECTION, SOLUTION INTRAVENOUS at 22:46

## 2024-08-07 RX ADMIN — CEFEPIME 1 G: 1 INJECTION, POWDER, FOR SOLUTION INTRAMUSCULAR; INTRAVENOUS at 10:15

## 2024-08-07 RX ADMIN — HYDROMORPHONE HYDROCHLORIDE 0.25 MG: 1 INJECTION, SOLUTION INTRAMUSCULAR; INTRAVENOUS; SUBCUTANEOUS at 19:12

## 2024-08-07 RX ADMIN — METRONIDAZOLE 500 MG: 500 INJECTION, SOLUTION INTRAVENOUS at 00:17

## 2024-08-07 RX ADMIN — CEFEPIME 1 G: 1 INJECTION, POWDER, FOR SOLUTION INTRAMUSCULAR; INTRAVENOUS at 01:46

## 2024-08-07 RX ADMIN — METRONIDAZOLE 500 MG: 500 INJECTION, SOLUTION INTRAVENOUS at 19:15

## 2024-08-07 RX ADMIN — CEFEPIME 1 G: 1 INJECTION, POWDER, FOR SOLUTION INTRAMUSCULAR; INTRAVENOUS at 19:04

## 2024-08-07 RX ADMIN — SODIUM CHLORIDE, POTASSIUM CHLORIDE, SODIUM LACTATE AND CALCIUM CHLORIDE 100 ML/HR: 600; 310; 30; 20 INJECTION, SOLUTION INTRAVENOUS at 15:46

## 2024-08-07 RX ADMIN — Medication 3 L/MIN: at 19:00

## 2024-08-07 RX ADMIN — PANTOPRAZOLE SODIUM 40 MG: 40 INJECTION, POWDER, FOR SOLUTION INTRAVENOUS at 06:20

## 2024-08-07 RX ADMIN — SODIUM CHLORIDE 100 ML/HR: 9 INJECTION, SOLUTION INTRAVENOUS at 06:20

## 2024-08-07 RX ADMIN — ZOLPIDEM TARTRATE 5 MG: 5 TABLET, COATED ORAL at 21:42

## 2024-08-07 SDOH — SOCIAL STABILITY: SOCIAL INSECURITY: HAS ANYONE EVER THREATENED TO HURT YOUR FAMILY OR YOUR PETS?: NO

## 2024-08-07 SDOH — SOCIAL STABILITY: SOCIAL INSECURITY: HAVE YOU HAD ANY THOUGHTS OF HARMING ANYONE ELSE?: NO

## 2024-08-07 SDOH — SOCIAL STABILITY: SOCIAL INSECURITY: ARE YOU OR HAVE YOU BEEN THREATENED OR ABUSED PHYSICALLY, EMOTIONALLY, OR SEXUALLY BY ANYONE?: NO

## 2024-08-07 SDOH — SOCIAL STABILITY: SOCIAL INSECURITY: DO YOU FEEL UNSAFE GOING BACK TO THE PLACE WHERE YOU ARE LIVING?: NO

## 2024-08-07 SDOH — SOCIAL STABILITY: SOCIAL INSECURITY: HAVE YOU HAD THOUGHTS OF HARMING ANYONE ELSE?: NO

## 2024-08-07 SDOH — SOCIAL STABILITY: SOCIAL INSECURITY: DOES ANYONE TRY TO KEEP YOU FROM HAVING/CONTACTING OTHER FRIENDS OR DOING THINGS OUTSIDE YOUR HOME?: NO

## 2024-08-07 SDOH — SOCIAL STABILITY: SOCIAL INSECURITY: DO YOU FEEL ANYONE HAS EXPLOITED OR TAKEN ADVANTAGE OF YOU FINANCIALLY OR OF YOUR PERSONAL PROPERTY?: NO

## 2024-08-07 SDOH — SOCIAL STABILITY: SOCIAL INSECURITY: WERE YOU ABLE TO COMPLETE ALL THE BEHAVIORAL HEALTH SCREENINGS?: YES

## 2024-08-07 SDOH — SOCIAL STABILITY: SOCIAL INSECURITY: ARE THERE ANY APPARENT SIGNS OF INJURIES/BEHAVIORS THAT COULD BE RELATED TO ABUSE/NEGLECT?: NO

## 2024-08-07 SDOH — SOCIAL STABILITY: SOCIAL INSECURITY: ABUSE: ADULT

## 2024-08-07 ASSESSMENT — PATIENT HEALTH QUESTIONNAIRE - PHQ9
SUM OF ALL RESPONSES TO PHQ9 QUESTIONS 1 & 2: 0
1. LITTLE INTEREST OR PLEASURE IN DOING THINGS: NOT AT ALL
2. FEELING DOWN, DEPRESSED OR HOPELESS: NOT AT ALL

## 2024-08-07 ASSESSMENT — ACTIVITIES OF DAILY LIVING (ADL)
TOILETING: INDEPENDENT
ADEQUATE_TO_COMPLETE_ADL: YES
BATHING: INDEPENDENT
HEARING - RIGHT EAR: FUNCTIONAL
LACK_OF_TRANSPORTATION: NO
HEARING - LEFT EAR: FUNCTIONAL
GROOMING: INDEPENDENT
ASSISTIVE_DEVICE: CONTACTS
DRESSING YOURSELF: INDEPENDENT
PATIENT'S MEMORY ADEQUATE TO SAFELY COMPLETE DAILY ACTIVITIES?: YES
WALKS IN HOME: NEEDS ASSISTANCE
FEEDING YOURSELF: INDEPENDENT
JUDGMENT_ADEQUATE_SAFELY_COMPLETE_DAILY_ACTIVITIES: YES

## 2024-08-07 ASSESSMENT — PAIN DESCRIPTION - DESCRIPTORS
DESCRIPTORS: CRAMPING
DESCRIPTORS: DULL;CRAMPING
DESCRIPTORS: CRAMPING

## 2024-08-07 ASSESSMENT — LIFESTYLE VARIABLES
AUDIT TOTAL SCORE: 0
SKIP TO QUESTIONS 9-10: 1
HOW OFTEN DURING THE LAST YEAR HAVE YOU HAD A FEELING OF GUILT OR REMORSE AFTER DRINKING: NEVER
HOW OFTEN DURING THE LAST YEAR HAVE YOU FAILED TO DO WHAT WAS NORMALLY EXPECTED FROM YOU BECAUSE OF DRINKING: NEVER
HOW OFTEN DO YOU HAVE A DRINK CONTAINING ALCOHOL: 2-3 TIMES A WEEK
HOW OFTEN DURING THE LAST YEAR HAVE YOU BEEN UNABLE TO REMEMBER WHAT HAPPENED THE NIGHT BEFORE BECAUSE YOU HAD BEEN DRINKING: NEVER
AUDIT-C TOTAL SCORE: 3
HOW OFTEN DURING THE LAST YEAR HAVE YOU NEEDED AN ALCOHOLIC DRINK FIRST THING IN THE MORNING TO GET YOURSELF GOING AFTER A NIGHT OF HEAVY DRINKING: NEVER
HOW OFTEN DURING THE LAST YEAR HAVE YOU FOUND THAT YOU WERE NOT ABLE TO STOP DRINKING ONCE YOU HAD STARTED: NEVER
AUDIT-C TOTAL SCORE: 3
AUDIT TOTAL SCORE: 3
HAVE YOU OR SOMEONE ELSE BEEN INJURED AS A RESULT OF YOUR DRINKING: NO
HOW MANY STANDARD DRINKS CONTAINING ALCOHOL DO YOU HAVE ON A TYPICAL DAY: 1 OR 2
HOW OFTEN DO YOU HAVE 6 OR MORE DRINKS ON ONE OCCASION: NEVER
HAS A RELATIVE, FRIEND, DOCTOR, OR ANOTHER HEALTH PROFESSIONAL EXPRESSED CONCERN ABOUT YOUR DRINKING OR SUGGESTED YOU CUT DOWN: NO

## 2024-08-07 ASSESSMENT — PAIN - FUNCTIONAL ASSESSMENT
PAIN_FUNCTIONAL_ASSESSMENT: 0-10
PAIN_FUNCTIONAL_ASSESSMENT: UNABLE TO SELF-REPORT
PAIN_FUNCTIONAL_ASSESSMENT: UNABLE TO SELF-REPORT
PAIN_FUNCTIONAL_ASSESSMENT: 0-10
PAIN_FUNCTIONAL_ASSESSMENT: UNABLE TO SELF-REPORT
PAIN_FUNCTIONAL_ASSESSMENT: 0-10

## 2024-08-07 ASSESSMENT — ENCOUNTER SYMPTOMS
ABDOMINAL PAIN: 1
APPETITE CHANGE: 1
VOMITING: 1
NAUSEA: 1
FATIGUE: 1
ACTIVITY CHANGE: 1

## 2024-08-07 ASSESSMENT — COGNITIVE AND FUNCTIONAL STATUS - GENERAL
DAILY ACTIVITIY SCORE: 18
WALKING IN HOSPITAL ROOM: A LITTLE
STANDING UP FROM CHAIR USING ARMS: A LITTLE
MOBILITY SCORE: 18
EATING MEALS: A LITTLE
MOVING FROM LYING ON BACK TO SITTING ON SIDE OF FLAT BED WITH BEDRAILS: A LITTLE
TURNING FROM BACK TO SIDE WHILE IN FLAT BAD: A LITTLE
HELP NEEDED FOR BATHING: A LITTLE
PERSONAL GROOMING: A LITTLE
TOILETING: A LITTLE
DRESSING REGULAR LOWER BODY CLOTHING: A LITTLE
DRESSING REGULAR UPPER BODY CLOTHING: A LITTLE
CLIMB 3 TO 5 STEPS WITH RAILING: A LITTLE
PATIENT BASELINE BEDBOUND: NO
MOVING TO AND FROM BED TO CHAIR: A LITTLE

## 2024-08-07 ASSESSMENT — PAIN SCALES - GENERAL
PAINLEVEL_OUTOF10: 3
PAINLEVEL_OUTOF10: 4
PAINLEVEL_OUTOF10: 7
PAINLEVEL_OUTOF10: 7
PAINLEVEL_OUTOF10: 4
PAINLEVEL_OUTOF10: 0 - NO PAIN
PAINLEVEL_OUTOF10: 3
PAINLEVEL_OUTOF10: 3

## 2024-08-07 ASSESSMENT — PAIN DESCRIPTION - LOCATION: LOCATION: ABDOMEN

## 2024-08-07 ASSESSMENT — PAIN DESCRIPTION - ORIENTATION: ORIENTATION: RIGHT

## 2024-08-07 NOTE — H&P
History Of Present Illness  Dinora Lawler is a 69 y.o. female with a past medical history of epidural hemorrhage complicated by neurogenic bladder, hyperlipidemia, and hypothyroidism who presented to [facility] ER complaining of right lower quadrant abdominal pain.  He endorses that the pain started 2 days ago and is moderate in intensity nothing really seems to make it worse or better.  Due to the the persistence of the pain he decided come to the emergency room department to have it evaluated she does intermittent straight caths because of her neurogenic bladder but denies any other urinary symptoms such as burning on urination foul-smelling urine fever or chills.  Her CT of the abdomen and pelvis showed signs concerning for ruptured acute appendicitis with abscess.  She was started on antibiotics cefepime and metronidazole in the ER.  General surgery will consulted.     Past Medical History  Past Medical History:   Diagnosis Date    Encounter for immunization 05/28/2020    Need for prophylactic vaccination and inoculation against influenza    Fever, unspecified 04/13/2015    Fever with chills    Other conditions influencing health status     Spinal Hematoma    Other general symptoms and signs 07/09/2013    Nonspecific abnormal finding    Quadriplegia, unspecified (Multi)     Quadriplegia    Unspecified symptoms and signs involving the genitourinary system 02/25/2016    UTI symptoms        Surgical History  Past Surgical History:   Procedure Laterality Date    BREAST BIOPSY      CERVICAL LAMINECTOMY  05/18/2015    Laminectomy Cervical    OTHER SURGICAL HISTORY  09/30/2016    Hysteroscopy With Resection For Intrauterine Polyp Removal         Social History  She reports that she has never smoked. She has never used smokeless tobacco. She reports current alcohol use of about 2.0 standard drinks of alcohol per week. She reports that she does not use drugs.    Family History  Family History   Problem Relation  "Name Age of Onset    Coronary artery disease Mother      Arthritis Father      Hypertension Father      Coronary artery disease Brother      Breast cancer Mother's Sister      Breast cancer Father's Sister          Allergies  Adhesive tape-silicones and Amoxicillin-pot clavulanate    Review of Systems   Constitutional:  Positive for activity change, appetite change and fatigue.   Gastrointestinal:  Positive for abdominal pain, nausea and vomiting.        Physical Exam  Vitals and nursing note reviewed.   Constitutional:       Appearance: Normal appearance. She is ill-appearing.   HENT:      Head: Normocephalic.      Right Ear: External ear normal.      Left Ear: External ear normal.      Nose: Nose normal.      Mouth/Throat:      Mouth: Mucous membranes are dry.      Pharynx: Oropharynx is clear.   Eyes:      Extraocular Movements: Extraocular movements intact.      Conjunctiva/sclera: Conjunctivae normal.      Pupils: Pupils are equal, round, and reactive to light.   Cardiovascular:      Rate and Rhythm: Normal rate and regular rhythm.   Pulmonary:      Effort: Pulmonary effort is normal.      Breath sounds: Normal breath sounds.   Abdominal:      General: Abdomen is flat. Bowel sounds are normal.      Palpations: Abdomen is soft.      Tenderness: There is abdominal tenderness.   Musculoskeletal:         General: Normal range of motion.   Skin:     General: Skin is warm and dry.   Neurological:      General: No focal deficit present.      Mental Status: She is alert. Mental status is at baseline.   Psychiatric:         Mood and Affect: Mood normal.         Behavior: Behavior normal.          Last Recorded Vitals  Blood pressure 133/84, pulse 90, temperature 37.3 °C (99.1 °F), temperature source Oral, resp. rate 16, height 1.702 m (5' 7\"), weight 74.4 kg (164 lb), SpO2 95%.    Relevant Results  Meds:  Scheduled medications  cefepime, 1 g, intravenous, q8h  enoxaparin, 40 mg, subcutaneous, q24h  levothyroxine, 100 " mcg, oral, Daily  metoprolol succinate XL, 25 mg, oral, Daily  metroNIDAZOLE, 500 mg, intravenous, q8h  pantoprazole, 40 mg, oral, Daily before breakfast   Or  pantoprazole, 40 mg, intravenous, Daily before breakfast  valACYclovir, 500 mg, oral, q12h ROSELIA      Continuous medications  sodium chloride 0.9%, 100 mL/hr      PRN medications  PRN medications: acetaminophen **OR** acetaminophen **OR** acetaminophen, fluticasone, HYDROmorphone, HYDROmorphone, LORazepam, ondansetron **OR** ondansetron, zolpidem   Current Outpatient Medications   Medication Instructions    alendronate (FOSAMAX) 70 mg, oral, Every 7 days    diclofenac sodium 1 % kit 1 application., Topical, Daily RT    ergocalciferol (VITAMIN D-2) 1,250 mcg, oral, Once Weekly    eszopiclone (LUNESTA) 2 mg, oral, Nightly PRN, Take immediately before bedtime    fluticasone (Flonase) 50 mcg/actuation nasal spray SPRAY 2 SPRAYS INTO EACH NOSTRIL EVERY DAY    levothyroxine (SYNTHROID, LEVOXYL) 100 mcg, oral, Daily    loperamide (Imodium) 2 mg capsule Take 2 capsules (4mg) as first dose. Take once capsule after each loose stool. Take no more than 8 capsules in 24 hours.    LORazepam (ATIVAN) 1 mg, oral, Every 6 hours PRN    metoprolol succinate XL (TOPROL-XL) 25 mg, oral, Daily    predniSONE (DELTASONE) 2.5 mg, oral, Daily    predniSONE (DELTASONE) 3 mg, oral, Daily    valACYclovir (VALTREX) 500 mg, oral, 2 times daily    zolpidem (AMBIEN) 10 mg, oral, Nightly PRN        Labs:  Results for orders placed or performed during the hospital encounter of 08/06/24 (from the past 24 hour(s))   CBC and Auto Differential   Result Value Ref Range    WBC 14.3 (H) 4.4 - 11.3 x10*3/uL    nRBC 0.0 0.0 - 0.0 /100 WBCs    RBC 4.26 4.00 - 5.20 x10*6/uL    Hemoglobin 12.4 12.0 - 16.0 g/dL    Hematocrit 38.1 36.0 - 46.0 %    MCV 89 80 - 100 fL    MCH 29.1 26.0 - 34.0 pg    MCHC 32.5 32.0 - 36.0 g/dL    RDW 12.8 11.5 - 14.5 %    Platelets 435 150 - 450 x10*3/uL    Neutrophils % 81.5 40.0  - 80.0 %    Immature Granulocytes %, Automated 0.6 0.0 - 0.9 %    Lymphocytes % 8.7 13.0 - 44.0 %    Monocytes % 8.5 2.0 - 10.0 %    Eosinophils % 0.4 0.0 - 6.0 %    Basophils % 0.3 0.0 - 2.0 %    Neutrophils Absolute 11.63 (H) 1.20 - 7.70 x10*3/uL    Immature Granulocytes Absolute, Automated 0.09 0.00 - 0.70 x10*3/uL    Lymphocytes Absolute 1.24 1.20 - 4.80 x10*3/uL    Monocytes Absolute 1.22 (H) 0.10 - 1.00 x10*3/uL    Eosinophils Absolute 0.05 0.00 - 0.70 x10*3/uL    Basophils Absolute 0.04 0.00 - 0.10 x10*3/uL   Comprehensive metabolic panel   Result Value Ref Range    Glucose 106 (H) 74 - 99 mg/dL    Sodium 134 (L) 136 - 145 mmol/L    Potassium 3.6 3.5 - 5.3 mmol/L    Chloride 98 98 - 107 mmol/L    Bicarbonate 27 21 - 32 mmol/L    Anion Gap 13 10 - 20 mmol/L    Urea Nitrogen 9 6 - 23 mg/dL    Creatinine 0.64 0.50 - 1.05 mg/dL    eGFR >90 >60 mL/min/1.73m*2    Calcium 9.2 8.6 - 10.3 mg/dL    Albumin 4.0 3.4 - 5.0 g/dL    Alkaline Phosphatase 84 33 - 136 U/L    Total Protein 7.7 6.4 - 8.2 g/dL    AST 12 9 - 39 U/L    Bilirubin, Total 0.8 0.0 - 1.2 mg/dL    ALT 10 7 - 45 U/L   Magnesium   Result Value Ref Range    Magnesium 2.00 1.60 - 2.40 mg/dL   Lactate   Result Value Ref Range    Lactate 0.8 0.4 - 2.0 mmol/L   Lipase   Result Value Ref Range    Lipase 9 9 - 82 U/L   Urinalysis with Reflex Culture and Microscopic   Result Value Ref Range    Color, Urine Colorless (N) Light-Yellow, Yellow, Dark-Yellow    Appearance, Urine Clear Clear    Specific Gravity, Urine 1.030 1.005 - 1.035    pH, Urine 7.0 5.0, 5.5, 6.0, 6.5, 7.0, 7.5, 8.0    Protein, Urine NEGATIVE NEGATIVE, 10 (TRACE), 20 (TRACE) mg/dL    Glucose, Urine Normal Normal mg/dL    Blood, Urine 0.03 (TRACE) (A) NEGATIVE    Ketones, Urine NEGATIVE NEGATIVE mg/dL    Bilirubin, Urine NEGATIVE NEGATIVE    Urobilinogen, Urine Normal Normal mg/dL    Nitrite, Urine NEGATIVE NEGATIVE    Leukocyte Esterase, Urine NEGATIVE NEGATIVE   Urinalysis Microscopic   Result  Value Ref Range    WBC, Urine NONE 1-5, NONE /HPF    RBC, Urine 1-2 NONE, 1-2, 3-5 /HPF      Imaging:  CT abdomen pelvis w IV contrast    Result Date: 8/6/2024  Interpreted By:  Jamila Alfonso, STUDY: CT ABDOMEN PELVIS W IV CONTRAST;  8/6/2024 10:00 pm   INDICATION: Signs/Symptoms:RLQ pain.   COMPARISON: None.   ACCESSION NUMBER(S): [id number]   ORDERING CLINICIAN: RUSTY MAYO   TECHNIQUE: Axial CT images of the abdomen and pelvis with coronal and sagittal reconstructed images obtained after intravenous administration of contrast   FINDINGS: LOWER CHEST: No acute abnormality of the lung bases. BONES: No acute osseous abnormality. ABDOMINAL WALL: Within normal limits.   ABDOMEN:   LIVER: Hypodensity adjacent to the falciform ligament, likely focal fatty infiltration. BILE DUCTS: No biliary dilatation. GALLBLADDER: No calcified gallstones. No pericholecystic inflammatory changes. PANCREAS: Within normal limits. SPLEEN: Within normal limits. ADRENALS: Within normal limits. KIDNEYS and URETERS: Symmetric renal enhancement. No hydronephrosis or perinephric fluid collection. Cortical hypodense lesions measuring up to 14 mm in the left kidney are most likely simple cysts.     VESSELS: No aortic aneurysm. RETROPERITONEUM: No pathologically enlarged retroperitoneal lymph nodes.   PELVIS:   REPRODUCTIVE ORGANS: No pelvic masses. BLADDER: Within normal limits.   BOWEL: The stomach is not distended. No dilated small bowel.  The appendix is enlarged and the mucosa is distinct. There is the fluid collection surrounding the appendix measuring 5 x 4 cm in transverse dimension, with surrounding fat stranding. Reactive wall thickening of the cecum and terminal ileum is noted. PERITONEUM: Small amount of free pelvic fluid. No pneumoperitoneum.       Pericecal inflammatory changes most consistent with ruptured acute appendicitis and 5 x 4 cm periappendiceal abscess.   Small amount of free fluid in the right lower quadrant and  "pelvis.     MACRO: None   Signed by: Jamilajennifer Alfonso 8/6/2024 11:51 PM Dictation workstation:   [id number]    MR cardiac angio chest w and wo IV contrast for morph FUNCT valve DZ and GREAT vessels    Result Date: 8/6/2024  Interpreted By:  Leoncio Staley, STUDY: MR CARDIAC ANGIO CHEST W AND WO IV CONTRAST FOR MORPH FUNCT VALVE DZ AND GREAT VESSELS;  8/6/2024 8:19 am   INDICATION: Signs/Symptoms:monitor TAA 4.5 cm.   This study is performed to assess myocardial viability and damage, and to quantitate left ventricular and valvular function.   COMPARISON: CT dated 08/04/2022   ACCESSION NUMBER(S): [id number]   ORDERING CLINICIAN: SONDRA GARZON   TECHNIQUE: Siemens1.5  Ariadne MRI scanner. Turbo spin echo and balanced steady state free precession (bSSFP) imaging for anatomic definition. Dynamic cine bSSFP for cardiac chamber and wall-motion analysis, and valvular analysis. Flow quantification sequences for hemodynamics. Delayed gadolinium enhancement analysis after injection of gadolinium-chelate (30 mL Dotarem, 0.2 mmol/kg).   FINDINGS: CARDIAC CHAMBERS Normal atrioventricular and ventriculoarterial concordance   LEFT ATRIUM Normal size   RIGHT ATRIUM Normal size   INTERATRIAL SEPTUM Intact.   LEFT VENTRICLE The left ventricle is normal in size and shape, and has normal global systolic function. There are no segmental wall motion abnormalities. Quantitative left ventricular functional values are as follows: EDV = 129 cc; EDVi = 68 cc/m2 ESV = 40 cc; ESVi = 21 cc/m2 Stroke volume = 89 cc; SVi = 47 cc/m2 LVEF = 69 % Absolute Cardiac Output = 8.19 l/min.; COi = 4.33 l/min/m2 LV mass = 108 gm; LVMi = 57 gm/m2   *Jed AM et al. Normalized left ventricular systolic and diastolic function by steady state free precession cardiovascular magnetic resonance. J Cardiovasc Magn Reson 2006; 8:417-26.   Delayed-enhancement imaging reveals uniformly \"nulled\" myocardium, signifying that there has been no prior ischemic myocardial " damage. There is also no definite evidence of interstitial fibrosis to suggest an infiltrative process. Myocardial T1 and T2 mapping times are normal.   RIGHT VENTRICLE The right ventricle appears normal in size, shape, and has normal qualitative systolic function. No segmental wall motion abnormalities. No abnormal delayed enhancement in the myocardium.   INTERVENTRICULAR SEPTUM Intact.   AORTIC VALVE There is  trace aortic regurgitation. Flow quantification through the ascending aorta: Forward volume =78 cc/beat Reverse volume = 5 cc/beat Net forward volume = 73 cc/beat Aortic regurgitant fraction = 7 %   MITRAL VALVE There is  no mitral regurgitation.   TRICUSPID VALVE There is qualitative no tricuspid regurgitation.   THORACIC AORTA The thoracic aorta appears normal in course, caliber, and contour. There is no evidence for acute aortic pathology. The arch vessel branching pattern is normal. All the arch branch vessels appear widely patent in their proximal portions. The visualized upper abdominal aorta is within normal limits.   REPRESENTATIVE MEASUREMENTS OF THE AORTA: Annulus 3.1 x 2.3 cm Root (Sinus of Valsalva) 3.4 cm (cusp to commissure) Sinotubular junction 3.4 cm Mid ascending 4.5 cm Distal ascending 3.9 cm Mid transverse arch 2.9 cm Isthmus 2.6 cm Mid descending 2.1 cm Distal descending (hiatus) 2.2 cm   PULMONARY ARTERIES The central pulmonary arteries appear  normal.   SYSTEMIC AND PULMONARY VEINS Normal systemic venous and pulmonary venous return. The SVC and IVC are of normal caliber. Normal pulmonary venous anatomy.   CHEST The chest wall is normal. No significant lymphadenopathy or mass is seen in limited images of the mediastinum. Limited imaging through the lungs reveals no gross abnormalities. No pleural effusion.   UPPER ABDOMEN There is a nonenhancing cyst in the left kidney.       1.  The left ventricle is normal in size, shape, and has normal global systolic function. LVEF = 69%. There are  no segmental wall motion abnormalities.  Quantitative values are as noted above. 2.  There are no findings to suggest prior ischemic damage or an infiltrative process. 3.  Trace aortic valve regurgitation (regurgitant fraction = 7%). 4. Aneurysm of the ascending aorta measuring up to 4.5 cm which is stable from prior exam. Remaining aorta is normal in course and caliber. Recommend continued imaging surveillance as per clinical guidelines.     MACRO: None   Signed by: Leoncio Staley 8/6/2024 9:48 AM Dictation workstation:   [id number]      Assessment/Plan   Ruptured acute appendicitis with abscess  Plan:  Cefepime  Metronidazole  IV hydration  N.p.o.  General Surgery  Pain control  Nausea control    Neurogenic bladder  Plan:  Patient may do self straight catheterizations    Hypertension  Plan:  Metoprolol XL 50 mg orally daily    Hypothyroidism  Levothyroxine 100 mcg orally daily    DVT prophylaxis  Plan:  Lovenox 40 mg subcutaneously daily  SCDs    I spent 60 minutes in the professional and overall care of this patient.      Ruddy Alfred, DO

## 2024-08-07 NOTE — ED PROVIDER NOTES
[facility] ED Note    Date of Service: 8/6/2024  Reason for Visit: Abdominal Pain (Lower right)      Patient History     HPI  Dinora Lawler is a 69 y.o. female with past medical history of epidural hemorrhage (complicated by neurogenic bladder), HLD, and hypothyroidism who is brought to the emergency department for right lower quadrant abdominal pain.  Patient states that the right lower quadrant abdominal pain started 2 days ago, no clear inciting event.  Patient states that the pain is a moderate intensity without any obvious aggravating/alleviating factors.  She does not note any specific medication that makes the pain better or worse.  Given the persistence of the pain she presented to the ED for further evaluation.  She intermittently straight caths because of her neurogenic bladder but denies any urinary urgency.  She denies any vaginal symptoms including vaginal bleeding or vaginal discharge.  She denies any lower extremity edema or rash.  She denies any nausea/vomiting, does have constipation as result of the autonomic dysfunction from the epidural hemorrhage but does not know any blood in her stool.  She further denies any fever, chills and otherwise feels well with the exception of the right lower quadrant abdominal pain.      Past Medical History:   Diagnosis Date    Encounter for immunization 05/28/2020    Need for prophylactic vaccination and inoculation against influenza    Fever, unspecified 04/13/2015    Fever with chills    Other conditions influencing health status     Spinal Hematoma    Other general symptoms and signs 07/09/2013    Nonspecific abnormal finding    Quadriplegia, unspecified (Multi)     Quadriplegia    Unspecified symptoms and signs involving the genitourinary system 02/25/2016    UTI symptoms     Past Surgical History:   Procedure Laterality Date    BREAST BIOPSY      CERVICAL LAMINECTOMY  05/18/2015    Laminectomy Cervical    OTHER SURGICAL HISTORY  09/30/2016     Hysteroscopy With Resection For Intrauterine Polyp Removal         Physical Exam     Vitals:    08/06/24 2259 08/06/24 2302 08/06/24 2357 08/07/24 0022   BP:  128/88 123/90 (!) 127/96   BP Location:       Patient Position:       Pulse:  99 89 91   Resp:  16 16 18   Temp:       TempSrc:       SpO2: 96% 97% 94% 94%   Weight:       Height:         General: Age-appropriate female, nontoxic, sitting comfortably in the gurney no acute distress.  Pulmonary:   Non-labored breathing. Breath sounds clear bilaterally  Cardiac:  Regular rate   Abdomen: Soft.  Mildly tender to palpation in the right lower quadrant without any rebound tenderness or guarding.  No obvious masses.  Musculoskeletal: No peripheral edema   Skin:  Dry, no rashes  Neuro: Alert and oriented.  Moves all 4 extremity spontaneously.    Diagnostic Studies     Labs:  Please see EMR for labs obtained during this patient encounter.    Radiology:  Please see EMR for imaging obtained during this patient encounter.    EKG:  Normal sinus rhythm, ventricular of 93.  Normal axis.  Normal AL interval of 164.  Normal ventricular conduction of the QRS duration 94 and a QTc interval of 450.  No Q waves appreciated, normal ST segments, T wave inversion seen in lead III and biphasic T waves seen in lead V2.  New T wave inversion in lead III when compared to prior but unchanged biphasic T wave in V2 from prior EKG on 8/21/2023.      ED Course and MDM     Dinora Lawler is a 69 y.o. female with a history and presentation as described above in HPI.      Upon presentation, the patient was afebrile, well-appearing With unremarkable vital signs.  Patient presented to the emergency department for a few day history of right lower quadrant abdominal pain.  Differential diagnosis includes appendicitis, cholecystitis, pancreatitis, or possible ovarian cyst.  Patient's labs were notable for a leukocytosis of 14.3.  Her CT abdomen/pelvis showed signs concerning for ruptured acute  appendicitis with a periappendiceal abscess.  She was started on Flagyl and cefepime.  I did offer her pain medications but patient declined.  I did consult surgery who will plan to evaluate the patient and patient will subsequently require admission for evaluation and treatment of her acute ruptured appendicitis.      Impression     Diagnoses as of 08/07/24 0040   Right lower quadrant abdominal pain   Ruptured appendicitis        Plan       Admit to medicine, as floor status for further evaluation and management of ruptured appendicitis    Trevon Plummer MD  [facility] Emergency Medicine         Trevon Plummer MD  08/07/24 0040

## 2024-08-07 NOTE — ANESTHESIA PROCEDURE NOTES
Peripheral IV  Date/Time: 8/7/2024 5:09 PM      Placement  Needle size: 18 G  Laterality: right  Location: wrist  Local anesthetic: none  Site prep: alcohol  Technique: anatomical landmarks  Attempts: 1

## 2024-08-07 NOTE — PROGRESS NOTES
Transitional Care Coordination Progress Note:  Plan per Medical/Surgical team: treatment of ruptured appendicitis with IV ATB, IV flagyl, IV Dilaudid, surgery consult   Status: Inpatient   Payor source: [place]  Discharge disposition: Home with    Potential Barriers: IR for drain placement   ADOD: 8/10/2024   MARTIN Hutchins RN, BSN Transitional Care Coordinator ED# [phone]      08/07/24 0803   Discharge Planning   Living Arrangements Spouse/significant other   Support Systems Spouse/significant other   Assistance Needed surgery?, IR for drain placement   Type of Residence Private residence   Number of Stairs to Enter Residence 3   Number of Stairs Within Residence 15   Home or Post Acute Services None   Expected Discharge Disposition Home   Does the patient need discharge transport arranged? No   Financial Resource Strain   How hard is it for you to pay for the very basics like food, housing, medical care, and heating? Not hard   Housing Stability   In the last 12 months, was there a time when you were not able to pay the mortgage or rent on time? N   In the past 12 months, how many times have you moved where you were living? 1   At any time in the past 12 months, were you homeless or living in a shelter (including now)? N   Transportation Needs   In the past 12 months, has lack of transportation kept you from medical appointments or from getting medications? no   In the past 12 months, has lack of transportation kept you from meetings, work, or from getting things needed for daily living? No

## 2024-08-07 NOTE — PROGRESS NOTES
Home with       08/07/24 2927   Current Planned Discharge Disposition   Current Planned Discharge Disposition Home

## 2024-08-07 NOTE — PROGRESS NOTES
Pharmacy Medication History Review   Spoke to the patient. Patient takes Prednisone daily (3 mg) Pt only takes Valacyclovir As Needed.  And she take her Thyroid 5 days a week      Dinora Lawler is a 69 y.o. female admitted for Right lower quadrant abdominal pain. Pharmacy reviewed the patient's ttnaz-[id number]-thxojcgsn medications and allergies for accuracy.    The list below reflectives the updated PTA list. Please review each medication in order reconciliation for additional clarification and justification.  Prior to Admission Medications   Prescriptions Last Dose Informant              ergocalciferol (Vitamin D-2) 1.25 MG (57789 UT) capsule     Sig: TAKE 1 CAPSULE BY MOUTH ONE TIME PER WEEK   Patient taking differently: Take 1 capsule (1,250 mcg) by mouth 1 (one) time per week. On Sunday   eszopiclone (Lunesta) 2 mg tablet 8/5/2024 at pm    Sig: TAKE 1 TABLET (2 MG) BY MOUTH AS NEEDED AT BEDTIME FOR SLEEP. TAKE IMMEDIATELY BEFORE BEDTIME   Patient taking differently: Take 0.5 tablets (1 mg) by mouth as needed at bedtime for sleep. ( Usually takes 1/2 tab) Take immediately before bedtime   fluticasone (Flonase) 50 mcg/actuation nasal spray     Sig: SPRAY 2 SPRAYS INTO EACH NOSTRIL EVERY DAY   levothyroxine (Synthroid, Levoxyl) 100 mcg tablet     Sig: TAKE 1 TABLET BY MOUTH ONCE DAILY.   Patient taking differently: Take 1 tablet (100 mcg) by mouth once daily (M-F). Only 5 days a week              metoprolol succinate XL (Toprol-XL) 25 mg 24 hr tablet 8/6/2024    Sig: TAKE 1 TABLET BY MOUTH EVERY DAY   predniSONE (Deltasone) 1 mg tablet 8/6/2024    Sig: Take 3 tablets (3 mg) by mouth once daily.   valACYclovir (Valtrex) 500 mg tablet     Sig: Take 1 tablet (500 mg) by mouth 2 times a day as needed.                 Facility-Administered Medications: None       The list below reflectives the updated allergy list. Please review each documented allergy for additional clarification and justification.  Allergies  Reviewed by Ruddy  JULIA Alfred DO on 8/7/2024        Severity Reactions Comments    Adhesive Tape-silicones Not Specified Unknown     Amoxicillin-pot Clavulanate Not Specified Unknown             Below are additional concerns with the patient's PTA list.      Arlyn Alanis

## 2024-08-07 NOTE — ANESTHESIA PREPROCEDURE EVALUATION
Patient: Dinora Lawler    Procedure Information       Date: 08/07/24    Procedure: Appendectomy Laparoscopy    Location: [facility] OR    Surgeons: Pravin Multani MD            Relevant Problems   Cardiac   (+) Hyperlipidemia   (+) PVC (premature ventricular contraction)      Endocrine   (+) Hypothyroidism   (+) Thyroid enlargement      Skin   (+) Anaphylactic urticaria      Nervous   (+) Polymyalgia rheumatica (Multi)      Circulatory   (+) Mild ascending aorta dilatation (CMS-HCC)       Clinical information reviewed:    Allergies  Meds  Problems              NPO Detail:  No data recorded     Physical Exam    Airway  Mallampati: II     Cardiovascular   Rhythm: regular  Rate: normal     Dental    Pulmonary    Abdominal          Anesthesia Plan    History of general anesthesia?: yes  History of complications of general anesthesia?: no    ASA 3     general     intravenous induction   Anesthetic plan and risks discussed with patient.    Plan discussed with CRNA and CAA.

## 2024-08-07 NOTE — PERIOPERATIVE NURSING NOTE
1828 Pt arrived to pacu bay at this time, report received from OR and anesthesia staff. Phase 1 care started. Pt to pacu bay sedated without s/sx of distress.  1830 pt weaned to RA. Remains sedated without s/sx of distress.  1845 pt becoming responsive to voice, no pain reported. Pt tolerating sips of water.   1900 pt onto 3L NC due to dec in pox. Pt continuing to report no pain.   1912 pt medicated per order with IVP diluadid for pain control.   1915 pt becoming more alert, continuing to tolerate sips of water.   1920 spoke with pt , made aware of pt time and plan of transport up to room.  1930 pt resting quietly, pain dec and tolerable.   1937 report to 709 RN via secure chat   1945 pt resting quietly, asleep in bed.   2000 pt resting quietly, aware of time and plan for transport up to room. Pain tolerable.   2004 Pt taken up to room via transport at this time in stable condition. Report previously sent to receiving RN. All belongings taken with pt to room, family updated on pt status.

## 2024-08-07 NOTE — CONSULTS
Reason For Consult  Ruptured acute appendicitis     History Of Present Illness  Dinora Lawler is a 69 y.o. female presenting with RLQ abdominal pain. She does have a history of constipation and did develop RLQ fullness a few days ago, which she attributed to constipation. She took a laxative, but did not get relief from the discomfort. She states that the area has been tender. She endorses nausea without vomiting. She does endorse chills without fevers. She denies any prior abdominal operations. She does not take any blood thinners, but states she does take Prednisone for polymyalgia rheumatica.     In the ED, she is afebrile and is not currently tachycardic. WBC 14.3, H/H 12.4/38.1, lactate 0.8. CT A/P demonstrated pericecal inflammatory changes most consistent with ruptured acute appendicitis and 5 x 4 cm periappendiceal abscess. Small amount of free fluid in the RLQ and pelvis. No pneumoperitoneum. Due to these findings, general surgery was contacted by the ED to evaluate the patient.      Past Medical History  She has a past medical history of Encounter for immunization (05/28/2020), Fever, unspecified (04/13/2015), Other conditions influencing health status, Other general symptoms and signs (07/09/2013), Quadriplegia, unspecified (Multi), and Unspecified symptoms and signs involving the genitourinary system (02/25/2016).    Surgical History  She has a past surgical history that includes Other surgical history (09/30/2016); Cervical laminectomy (05/18/2015); and Breast biopsy.     Social History  She reports that she has never smoked. She has never used smokeless tobacco. She reports current alcohol use of about 2.0 standard drinks of alcohol per week. She reports that she does not use drugs.    Family History  Family History   Problem Relation Name Age of Onset    Coronary artery disease Mother      Arthritis Father      Hypertension Father      Coronary artery disease Brother      Breast cancer Mother's Sister    "   Breast cancer Father's Sister          Allergies  Adhesive tape-silicones and Amoxicillin-pot clavulanate    Review of Systems  ABD: + pain, nausea. Denies vomiting.      Physical Exam  Constitutional: Awake/alert/oriented x3, no distress, cooperative.  Skin: Warm and dry.  Eyes: EOMI, clear sclera.  ENMT: Mucus membranes moist, no apparent injury.  Head/Neck: Neck supple, no apparent injury.  Respiratory: Unlabored breathing.  Cardiovascular: RRR.  GI: Non distended, soft, mildly tender to palpation of RLQ.   Musculoskeletal: ROM intact, normal strength.  Extremities: MARION.  Neurological: Alert and oriented x3, no focal deficits.  Psychological: Appropriate mood and behavior.      Last Recorded Vitals  Blood pressure (!) 127/96, pulse 91, temperature 37.3 °C (99.1 °F), temperature source Oral, resp. rate 18, height 1.702 m (5' 7\"), weight 74.4 kg (164 lb), SpO2 94%.    Relevant Results  Results for orders placed or performed during the hospital encounter of 08/06/24 (from the past 24 hour(s))   CBC and Auto Differential   Result Value Ref Range    WBC 14.3 (H) 4.4 - 11.3 x10*3/uL    nRBC 0.0 0.0 - 0.0 /100 WBCs    RBC 4.26 4.00 - 5.20 x10*6/uL    Hemoglobin 12.4 12.0 - 16.0 g/dL    Hematocrit 38.1 36.0 - 46.0 %    MCV 89 80 - 100 fL    MCH 29.1 26.0 - 34.0 pg    MCHC 32.5 32.0 - 36.0 g/dL    RDW 12.8 11.5 - 14.5 %    Platelets 435 150 - 450 x10*3/uL    Neutrophils % 81.5 40.0 - 80.0 %    Immature Granulocytes %, Automated 0.6 0.0 - 0.9 %    Lymphocytes % 8.7 13.0 - 44.0 %    Monocytes % 8.5 2.0 - 10.0 %    Eosinophils % 0.4 0.0 - 6.0 %    Basophils % 0.3 0.0 - 2.0 %    Neutrophils Absolute 11.63 (H) 1.20 - 7.70 x10*3/uL    Immature Granulocytes Absolute, Automated 0.09 0.00 - 0.70 x10*3/uL    Lymphocytes Absolute 1.24 1.20 - 4.80 x10*3/uL    Monocytes Absolute 1.22 (H) 0.10 - 1.00 x10*3/uL    Eosinophils Absolute 0.05 0.00 - 0.70 x10*3/uL    Basophils Absolute 0.04 0.00 - 0.10 x10*3/uL   Comprehensive metabolic " panel   Result Value Ref Range    Glucose 106 (H) 74 - 99 mg/dL    Sodium 134 (L) 136 - 145 mmol/L    Potassium 3.6 3.5 - 5.3 mmol/L    Chloride 98 98 - 107 mmol/L    Bicarbonate 27 21 - 32 mmol/L    Anion Gap 13 10 - 20 mmol/L    Urea Nitrogen 9 6 - 23 mg/dL    Creatinine 0.64 0.50 - 1.05 mg/dL    eGFR >90 >60 mL/min/1.73m*2    Calcium 9.2 8.6 - 10.3 mg/dL    Albumin 4.0 3.4 - 5.0 g/dL    Alkaline Phosphatase 84 33 - 136 U/L    Total Protein 7.7 6.4 - 8.2 g/dL    AST 12 9 - 39 U/L    Bilirubin, Total 0.8 0.0 - 1.2 mg/dL    ALT 10 7 - 45 U/L   Magnesium   Result Value Ref Range    Magnesium 2.00 1.60 - 2.40 mg/dL   Lactate   Result Value Ref Range    Lactate 0.8 0.4 - 2.0 mmol/L   Lipase   Result Value Ref Range    Lipase 9 9 - 82 U/L   Urinalysis with Reflex Culture and Microscopic   Result Value Ref Range    Color, Urine Colorless (N) Light-Yellow, Yellow, Dark-Yellow    Appearance, Urine Clear Clear    Specific Gravity, Urine 1.030 1.005 - 1.035    pH, Urine 7.0 5.0, 5.5, 6.0, 6.5, 7.0, 7.5, 8.0    Protein, Urine NEGATIVE NEGATIVE, 10 (TRACE), 20 (TRACE) mg/dL    Glucose, Urine Normal Normal mg/dL    Blood, Urine 0.03 (TRACE) (A) NEGATIVE    Ketones, Urine NEGATIVE NEGATIVE mg/dL    Bilirubin, Urine NEGATIVE NEGATIVE    Urobilinogen, Urine Normal Normal mg/dL    Nitrite, Urine NEGATIVE NEGATIVE    Leukocyte Esterase, Urine NEGATIVE NEGATIVE   Urinalysis Microscopic   Result Value Ref Range    WBC, Urine NONE 1-5, NONE /HPF    RBC, Urine 1-2 NONE, 1-2, 3-5 /HPF      CT abdomen pelvis w IV contrast    Result Date: 8/6/2024  Interpreted By:  Jamila Alfonso, STUDY: CT ABDOMEN PELVIS W IV CONTRAST;  8/6/2024 10:00 pm   INDICATION: Signs/Symptoms:RLQ pain.   COMPARISON: None.   ACCESSION NUMBER(S): [id number]   ORDERING CLINICIAN: RUSTY MAYO   TECHNIQUE: Axial CT images of the abdomen and pelvis with coronal and sagittal reconstructed images obtained after intravenous administration of contrast   FINDINGS:  LOWER CHEST: No acute abnormality of the lung bases. BONES: No acute osseous abnormality. ABDOMINAL WALL: Within normal limits.   ABDOMEN:   LIVER: Hypodensity adjacent to the falciform ligament, likely focal fatty infiltration. BILE DUCTS: No biliary dilatation. GALLBLADDER: No calcified gallstones. No pericholecystic inflammatory changes. PANCREAS: Within normal limits. SPLEEN: Within normal limits. ADRENALS: Within normal limits. KIDNEYS and URETERS: Symmetric renal enhancement. No hydronephrosis or perinephric fluid collection. Cortical hypodense lesions measuring up to 14 mm in the left kidney are most likely simple cysts.     VESSELS: No aortic aneurysm. RETROPERITONEUM: No pathologically enlarged retroperitoneal lymph nodes.   PELVIS:   REPRODUCTIVE ORGANS: No pelvic masses. BLADDER: Within normal limits.   BOWEL: The stomach is not distended. No dilated small bowel.  The appendix is enlarged and the mucosa is distinct. There is the fluid collection surrounding the appendix measuring 5 x 4 cm in transverse dimension, with surrounding fat stranding. Reactive wall thickening of the cecum and terminal ileum is noted. PERITONEUM: Small amount of free pelvic fluid. No pneumoperitoneum.       Pericecal inflammatory changes most consistent with ruptured acute appendicitis and 5 x 4 cm periappendiceal abscess.   Small amount of free fluid in the right lower quadrant and pelvis.     MACRO: None   Signed by: Jamila Alfonso 8/6/2024 11:51 PM Dictation workstation:   [id number]    MR cardiac angio chest w and wo IV contrast for morph FUNCT valve DZ and GREAT vessels    Result Date: 8/6/2024  Interpreted By:  Leoncio Staley, STUDY: MR CARDIAC ANGIO CHEST W AND WO IV CONTRAST FOR MORPH FUNCT VALVE DZ AND GREAT VESSELS;  8/6/2024 8:19 am   INDICATION: Signs/Symptoms:monitor TAA 4.5 cm.   This study is performed to assess myocardial viability and damage, and to quantitate left ventricular and valvular function.   COMPARISON:  "CT dated 08/04/2022   ACCESSION NUMBER(S): [id number]   ORDERING CLINICIAN: SONDRA GARZON   TECHNIQUE: Siemens1.5  Ariadne MRI scanner. Turbo spin echo and balanced steady state free precession (bSSFP) imaging for anatomic definition. Dynamic cine bSSFP for cardiac chamber and wall-motion analysis, and valvular analysis. Flow quantification sequences for hemodynamics. Delayed gadolinium enhancement analysis after injection of gadolinium-chelate (30 mL Dotarem, 0.2 mmol/kg).   FINDINGS: CARDIAC CHAMBERS Normal atrioventricular and ventriculoarterial concordance   LEFT ATRIUM Normal size   RIGHT ATRIUM Normal size   INTERATRIAL SEPTUM Intact.   LEFT VENTRICLE The left ventricle is normal in size and shape, and has normal global systolic function. There are no segmental wall motion abnormalities. Quantitative left ventricular functional values are as follows: EDV = 129 cc; EDVi = 68 cc/m2 ESV = 40 cc; ESVi = 21 cc/m2 Stroke volume = 89 cc; SVi = 47 cc/m2 LVEF = 69 % Absolute Cardiac Output = 8.19 l/min.; COi = 4.33 l/min/m2 LV mass = 108 gm; LVMi = 57 gm/m2   *Jed AM et al. Normalized left ventricular systolic and diastolic function by steady state free precession cardiovascular magnetic resonance. J Cardiovasc Magn Reson 2006; 8:417-26.   Delayed-enhancement imaging reveals uniformly \"nulled\" myocardium, signifying that there has been no prior ischemic myocardial damage. There is also no definite evidence of interstitial fibrosis to suggest an infiltrative process. Myocardial T1 and T2 mapping times are normal.   RIGHT VENTRICLE The right ventricle appears normal in size, shape, and has normal qualitative systolic function. No segmental wall motion abnormalities. No abnormal delayed enhancement in the myocardium.   INTERVENTRICULAR SEPTUM Intact.   AORTIC VALVE There is  trace aortic regurgitation. Flow quantification through the ascending aorta: Forward volume =78 cc/beat Reverse volume = 5 cc/beat Net forward " volume = 73 cc/beat Aortic regurgitant fraction = 7 %   MITRAL VALVE There is  no mitral regurgitation.   TRICUSPID VALVE There is qualitative no tricuspid regurgitation.   THORACIC AORTA The thoracic aorta appears normal in course, caliber, and contour. There is no evidence for acute aortic pathology. The arch vessel branching pattern is normal. All the arch branch vessels appear widely patent in their proximal portions. The visualized upper abdominal aorta is within normal limits.   REPRESENTATIVE MEASUREMENTS OF THE AORTA: Annulus 3.1 x 2.3 cm Root (Sinus of Valsalva) 3.4 cm (cusp to commissure) Sinotubular junction 3.4 cm Mid ascending 4.5 cm Distal ascending 3.9 cm Mid transverse arch 2.9 cm Isthmus 2.6 cm Mid descending 2.1 cm Distal descending (hiatus) 2.2 cm   PULMONARY ARTERIES The central pulmonary arteries appear  normal.   SYSTEMIC AND PULMONARY VEINS Normal systemic venous and pulmonary venous return. The SVC and IVC are of normal caliber. Normal pulmonary venous anatomy.   CHEST The chest wall is normal. No significant lymphadenopathy or mass is seen in limited images of the mediastinum. Limited imaging through the lungs reveals no gross abnormalities. No pleural effusion.   UPPER ABDOMEN There is a nonenhancing cyst in the left kidney.       1.  The left ventricle is normal in size, shape, and has normal global systolic function. LVEF = 69%. There are no segmental wall motion abnormalities.  Quantitative values are as noted above. 2.  There are no findings to suggest prior ischemic damage or an infiltrative process. 3.  Trace aortic valve regurgitation (regurgitant fraction = 7%). 4. Aneurysm of the ascending aorta measuring up to 4.5 cm which is stable from prior exam. Remaining aorta is normal in course and caliber. Recommend continued imaging surveillance as per clinical guidelines.     MACRO: None   Signed by: Leoncio Staley 8/6/2024 9:48 AM Dictation workstation:   [id number]        Assessment/Plan  Ruptured acute appendicitis and periappendiceal abscess  - Abdomen non distended, soft, mildly tender to palpation of RLQ. Endorses nausea without vomiting.    - Pain control as needed  - PRN antiemetic  - NPO  - Likely OR vs IR today  - IV antibiotics  - IVF  - AM labs    Dispo: Likely OR vs. IR today. Final plan to come this morning. Will closely evaluate patient for any worsening signs or symptoms.     I spent 35 minutes in the professional and overall care of this patient.      Maren Santos, APRN-CNP

## 2024-08-07 NOTE — CONSULTS
Consults    Reason For Consult  Appendiceal abscess drain placement    History Of Present Illness  Dinora Lawler is a 69 y.o. female presenting with a past medical history of bilateral cataracts, glaucoma, HLD, osteoporosis, hypothyroidism, epidural hematoma complicated by neurogenic bladder and chronic constipation. Admitted to [facility] for RLQ appendiceal rupture and abscess. 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 She reports her abdominal pain started Sunday 8/4 and has been progressive in nature. She reports currently the pain is a 2/10, dull ache and is much improved after receiving dilaudid. She also reports chills, nausea and constipation. She denies fever, vomiting, urinary symptoms or diarrhea.     Past Medical History  She has a past medical history of Encounter for immunization (05/28/2020), Fever, unspecified (04/13/2015), Other conditions influencing health status, Other general symptoms and signs (07/09/2013), Quadriplegia, unspecified (Multi), and Unspecified symptoms and signs involving the genitourinary system (02/25/2016).    Surgical History  She has a past surgical history that includes Other surgical history (09/30/2016); Cervical laminectomy (05/18/2015); and Breast biopsy.     Social History  She reports that she has never smoked. She has never used smokeless tobacco. She reports current alcohol use of about 2.0 standard drinks of alcohol per week. She reports that she does not use drugs.    Family History  Family History   Problem Relation Name Age of Onset    Coronary artery disease Mother      Arthritis Father      Hypertension Father      Coronary artery disease Brother      Breast cancer Mother's Sister      Breast cancer Father's Sister          Allergies  Adhesive tape-silicones and Amoxicillin-pot clavulanate    MEDS:    Current Facility-Administered Medications:     acetaminophen (Tylenol) tablet 650 mg, 650 mg, oral, q4h PRN **OR** acetaminophen (Tylenol) oral liquid 650 mg, 650  mg, oral, q4h PRN **OR** acetaminophen (Tylenol) suppository 650 mg, 650 mg, rectal, q4h PRN, Ruddy Alfred DO    cefepime (Maxipime) in dextrose 5% 50 mL IV 1 g, 1 g, intravenous, q8h, Ruddy Alfred DO, 1 g at 08/07/24 1015    enoxaparin (Lovenox) syringe 40 mg, 40 mg, subcutaneous, q24h, Ruddy Alfred DO    fluticasone (Flonase) nasal spray 2 spray, 2 spray, Each Nostril, Daily PRN, Ruddy Alfred DO    HYDROmorphone (Dilaudid) injection 0.4 mg, 0.4 mg, intravenous, q4h PRN, Ruddy Alfred DO    HYDROmorphone PF (Dilaudid) injection 0.2 mg, 0.2 mg, intravenous, q4h PRN, Ruddy Alfred DO, 0.2 mg at 08/07/24 0842    [START ON 8/8/2024] levothyroxine (Synthroid, Levoxyl) tablet 100 mcg, 100 mcg, oral, Once per day on Monday Tuesday Wednesday Thursday Friday, Ruddy Alfred DO    metoprolol succinate XL (Toprol-XL) 24 hr tablet 25 mg, 25 mg, oral, Daily, Ruddy Alfred DO, 25 mg at 08/07/24 0845    metroNIDAZOLE (Flagyl) 500 mg in sodium chloride (iso)  mL, 500 mg, intravenous, q8h, Ruddy Alfred DO, Stopped at 08/07/24 1034    ondansetron (Zofran) tablet 4 mg, 4 mg, oral, q8h PRN **OR** ondansetron (Zofran) injection 4 mg, 4 mg, intravenous, q8h PRN, Ruddy Alfred DO    pantoprazole (ProtoNix) EC tablet 40 mg, 40 mg, oral, Daily before breakfast **OR** pantoprazole (ProtoNix) injection 40 mg, 40 mg, intravenous, Daily before breakfast, Ruddy Alfred DO, 40 mg at 08/07/24 0620    sodium chloride 0.9% infusion, 100 mL/hr, intravenous, Continuous, Ruddy Alfred DO, Last Rate: 100 mL/hr at 08/07/24 1034, 100 mL/hr at 08/07/24 1034    valACYclovir (Valtrex) tablet 500 mg, 500 mg, oral, q12h PRN, Ruddy Alfred DO    zolpidem (Ambien) tablet 5 mg, 5 mg, oral, Nightly, Ruddy Alfred DO    Current Outpatient Medications:     metoprolol succinate XL (Toprol-XL) 25 mg 24 hr tablet, TAKE 1 TABLET BY MOUTH EVERY DAY, Disp: 90 tablet, Rfl: 3    predniSONE (Deltasone) 1 mg tablet, Take 3  tablets (3 mg) by mouth once daily., Disp: 270 tablet, Rfl: 1    ergocalciferol (Vitamin D-2) 1.25 MG (38508 UT) capsule, TAKE 1 CAPSULE BY MOUTH ONE TIME PER WEEK (Patient taking differently: Take 1 capsule (1,250 mcg) by mouth 1 (one) time per week. On Sunday), Disp: 12 capsule, Rfl: 2    eszopiclone (Lunesta) 2 mg tablet, TAKE 1 TABLET (2 MG) BY MOUTH AS NEEDED AT BEDTIME FOR SLEEP. TAKE IMMEDIATELY BEFORE BEDTIME (Patient taking differently: Take 0.5 tablets (1 mg) by mouth as needed at bedtime for sleep. ( Usually takes 1/2 tab) Take immediately before bedtime), Disp: 30 tablet, Rfl: 0    fluticasone (Flonase) 50 mcg/actuation nasal spray, SPRAY 2 SPRAYS INTO EACH NOSTRIL EVERY DAY, Disp: 48 mL, Rfl: 2    levothyroxine (Synthroid, Levoxyl) 100 mcg tablet, TAKE 1 TABLET BY MOUTH ONCE DAILY. (Patient taking differently: Take 1 tablet (100 mcg) by mouth once daily (M-F). Only 5 days a week), Disp: 90 tablet, Rfl: 3    loperamide (Imodium) 2 mg capsule, Take 2 capsules (4mg) as first dose. Take once capsule after each loose stool. Take no more than 8 capsules in 24 hours. (Patient not taking: Reported on 8/7/2024), Disp: 12 capsule, Rfl: 0    LORazepam (Ativan) 1 mg tablet, Take 1 tablet (1 mg) by mouth every 6 hours if needed for anxiety for up to 7 days. (Patient not taking: Reported on 8/7/2024), Disp: 2 tablet, Rfl: 0    valACYclovir (Valtrex) 500 mg tablet, Take 1 tablet (500 mg) by mouth 2 times a day as needed., Disp: , Rfl:     zolpidem (Ambien) 10 mg tablet, Take 1 tablet (10 mg) by mouth as needed at bedtime for sleep. (Patient not taking: Reported on 8/7/2024), Disp: 30 tablet, Rfl: 0    Review of Systems  Respiratory ROS: no cough, shortness of breath, or wheezing  Cardiovascular ROS: no chest pain or dyspnea on exertion  Gastrointestinal ROS: positive for - abdominal pain, constipation, and nausea/vomiting  Neurological ROS: negative     Last Recorded Vitals  BP (!) 125/94   Pulse 90   Temp 37.3 °C  "(99.1 °F) (Oral)   Resp 18   Wt 74.4 kg (164 lb)   SpO2 94%      Physical Exam  Orientation: oriented to person, place, time, and general circumstances  HEENT: normocephalic, atraumatic  Pulm: clear to auscultation bilaterally, no wheezes, good air entry  Cardiac: Regular rate and rhythm or without murmur or extra heart sounds  GI:  abdominal tenderness to the RLQ  Pulses: peripheral pulses symmetrical    Relevant Results    LABS:  Lab Results   Component Value Date    WBC 11.4 (H) 08/07/2024    HGB 12.0 08/07/2024    HCT 36.1 08/07/2024    MCV 90 08/07/2024     08/07/2024      Results from last 72 hours   Lab Units 08/07/24  0451   SODIUM mmol/L 135*   POTASSIUM mmol/L 3.7   CHLORIDE mmol/L 101   CO2 mmol/L 26   BUN mg/dL 8   CREATININE mg/dL 0.55   GLUCOSE mg/dL 91   CALCIUM mg/dL 8.7   ANION GAP mmol/L 12   EGFR mL/min/1.73m*2 >90     Results from last 72 hours   Lab Units 08/06/24 2058   ALK PHOS U/L 84   BILIRUBIN TOTAL mg/dL 0.8   PROTEIN TOTAL g/dL 7.7   ALT U/L 10   AST U/L 12   ALBUMIN g/dL 4.0           No lab exists for component: \"PT\"    MICRO:  No results found for the last 14 days.      IMAGING:   CT abdomen pelvis w IV contrast   Final Result   Pericecal inflammatory changes most consistent with ruptured acute   appendicitis and 5 x 4 cm periappendiceal abscess.        Small amount of free fluid in the right lower quadrant and pelvis.             MACRO:   None        Signed by: Jamila Alfonso 8/6/2024 11:51 PM   Dictation workstation:   [id number]      Consult to Interventional Radiology    (Results Pending)          Assessment/Plan     This is a 69 y.o. female presenting with a past medical history of bilateral cataracts, glaucoma, HLD, osteoporosis, hypothyroidism, epidural hematoma complicated by neurogenic bladder and chronic constipation. Admitted to [facility] for RLQ appendiceal rupture and abscess. 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 She reports her abdominal pain started " Sunday 8/4 and has been progressive in nature. She reports currently the pain is a 2/10, dull ache and is much improved after receiving dilaudid. She also reports chills, nausea and constipation. She denies fever, vomiting, urinary symptoms or diarrhea.    Patient is resting comfortably in bed at this time, VSS. WBC count down to 11.4. Surgery has asked for IR to evaluate for drain placement. CT imaging shows a complex RLQ abscess with bowel thickening and a small fluid pocket. This fluid collection is small ~3.0 cm in its largest diameter and is located retrocecally and unable to be accessed with a needle for drain placement or aspiration. Would recommend surgery vs conservative management with IV abx and repeat CT scan in a few days. No plan for IR procedure. Updated Dr. Avilez and Dr. Multani.    Please contact IR with any questions.     Siddhartha Arenas, APRN-CNP    Time : Billing Time  Prep time on date of the patient encounter: 15 minutes.   Time spent directly with patient/family/caregiver: 15 minutes.   Documentation time: 15 minutes.   Total time (minutes):  45 minutes  Time Spent with this Patient (minutes).  More than 50% of This Time was Spent in Counseling and/or Coordination of Care

## 2024-08-07 NOTE — OP NOTE
Appendectomy Laparoscopy Operative Note     Date: 2024 - 2024  OR Location: [facility] A OR    Name: Dinora Lawler, : 1955, Age: 69 y.o., MRN: [id number], Sex: female    Diagnosis  Pre-op Diagnosis      * Right lower quadrant abdominal pain [R10.31]     * Ruptured appendicitis [K35.32] Post-op Diagnosis     * Right lower quadrant abdominal pain [R10.31]     * Ruptured appendicitis [K35.32]     Procedures  Appendectomy Laparoscopy  61022 - CT LAPAROSCOPIC APPENDECTOMY      Surgeons      * Pravin Multani - Primary    Resident/Fellow/Other Assistant:  Surgeons and Role:  * No surgeons found with a matching role *    Procedure Summary  Anesthesia: General  ASA: III  Anesthesia Staff: Anesthesiologist: Osiel Kent MD  C-AA: CHALO Levy  Estimated Blood Loss: 40mL  Intra-op Medications:   Administrations occurring from 1600 to 1740 on 24:   Medication Name Total Dose   BUPivacaine HCl (Marcaine) 0.5 % (5 mg/mL) 20 mL, lidocaine-epinephrine (Xylocaine W/EPI) 1 %-1:100,000 20 mL syringe 14 mL   HYDROmorphone (Dilaudid) injection 0.4 mg 0.5 mg   lactated Ringer's infusion Cannot be calculated              Anesthesia Record               Intraprocedure I/O Totals          Intake    lactated Ringer's infusion 1000.00 mL    Total Intake 1000 mL          Specimen:   ID Type Source Tests Collected by Time   1 : APPENDIX Tissue APPENDIX SURGICAL PATHOLOGY EXAM Pravin Multani MD 2024 [id number]        Staff:   Circulator: Christa Ignacio Person: Doreen Khanub Person: David Villagran Scrub: Beulah         Drains and/or Catheters:   Closed/Suction Drain 1 Left LLQ Bulb 19 Fr. (Active)       Urethral Catheter Non-latex 16 Fr. (Active)       [REMOVED] NG/OG/Feeding Tube Gastric 18 Fr Center mouth (Removed)   Tube Status Low intermittent suction 24 1651   Placement Verification Gastric contents 24 1651       Tourniquet Times:         Implants:     Findings: Perforated appendicitis    Indications: Dinora  Bucky is an 69 y.o. female who is having surgery for Right lower quadrant abdominal pain [R10.31]  Ruptured appendicitis [K35.32].  Roughly 1 week of feeling unwell.  Imaging consistent with perforated appendicitis not amenable to IR drainage.  Chronic steroid use for polymyalgia rheumatica    The patient was seen in the preoperative area. The risks, benefits, complications, treatment options, non-operative alternatives, expected recovery and outcomes were discussed with the patient. The possibilities of reaction to medication, pulmonary aspiration, injury to surrounding structures, bleeding, recurrent infection, the need for additional procedures, failure to diagnose a condition, and creating a complication requiring transfusion or operation were discussed with the patient. The patient concurred with the proposed plan, giving informed consent.  The site of surgery was properly noted/marked if necessary per policy. The patient has been actively warmed in preoperative area. Preoperative antibiotics have been ordered and given within 1 hours of incision. Venous thrombosis prophylaxis have been ordered including bilateral sequential compression devices    Procedure Details:   Patient was identified in the preoperative area and transported to the operating room.  They were was placed supine on the OR table and general anesthesia was induced.  SCDs and Land catheter were placed.  The abdomen was clipped, prepped, draped in the usual sterile fashion.  Time-out was performed confirming patient procedure and perioperative criteria.  Veress needle was inserted at gannon's point with a good drop test.  The abdomen was insufflated to 15 millimeters mercury with low initial pressures.  A 5 millimeter Visiport was placed in the left abdomen under direct visualization.  Camera was inserted in no entry injury was identified.  Veress was removed.  12 millimeter umbilical port was placed under direct visualization followed by a 5  millimeter suprapubic port.  Patient was appropriately positioned and attention was turned to the right lower quadrant.  The terminal ileum was high in the right lower quadrant and relatively immobile.  In breaking through the phlegmon and medializing this there was a large amount of purulent drainage consistent with perforation.  Planes were significantly distorted given the phlegmon and abscess.  We could easily visualize the terminal ileum and the majority of the cecum which were viable and well-perfused.  He began in the mid ascending colon and open the white line of Toldt moving proximally to inflamed planes.  Bluntly we isolated the mesoappendix then used energy to divide the mesoappendix until we found the base of the appendix.  This appeared viable as did the surrounding cecum.  the appendix was divided with a green load 45 millimeter Endo VASU stapler.  Staple line was hemostatic and there was no narrowing of the terminal ileum or cecum.  This was irrigated and confirmed to be hemostatic.  The divided appendix was placed in an Endo-Catch bag and removed via the umbilical port site.  Abdomen was again surveyed without issue.  We irrigated over the liver and in the pelvis given the volume of purulent spillage.  The umbilical port site fascia was closed with an 0 Vicryl in a figure-of-eight fashion with the Fco Serrano suture Passer.  Abdomen was desufflated and skin was closed with 4 Monocryl followed by Steri-Strips.  Prior to complete closure all counts were confirmed correct.  Patient was then extubated transported to PACU in stable condition.   Complications:  None; patient tolerated the procedure well.    Disposition: PACU - hemodynamically stable.  Condition: stable         Additional Details:     Attending Attestation: I was present and scrubbed for the entire procedure.    Pravin Multani  Phone Number: [phone]

## 2024-08-07 NOTE — ANESTHESIA POSTPROCEDURE EVALUATION
Patient: Dinora Lawler    Procedure Summary       Date: 08/07/24 Room / Location: [facility] OR 05 / [facility] OR    Anesthesia Start: 1632 Anesthesia Stop: 1830    Procedure: Appendectomy Laparoscopy Diagnosis:       Right lower quadrant abdominal pain      Ruptured appendicitis      (Right lower quadrant abdominal pain [R10.31])      (Ruptured appendicitis [K35.32])    Surgeons: Pravin Multani MD Responsible Provider: Osiel Kent MD    Anesthesia Type: general ASA Status: 3            Anesthesia Type: general    Vitals Value Taken Time   /78 08/07/24 1846   Temp 36.9 °C (98.4 °F) 08/07/24 1828   Pulse 86 08/07/24 1858   Resp 16 08/07/24 1845   SpO2 88 % 08/07/24 1858   Vitals shown include unfiled device data.    Anesthesia Post Evaluation    Patient location during evaluation: bedside  Patient participation: complete - patient participated  Level of consciousness: awake  Pain management: adequate  Airway patency: patent  Cardiovascular status: acceptable  Respiratory status: acceptable  Hydration status: acceptable  Postoperative Nausea and Vomiting: none        No notable events documented.

## 2024-08-07 NOTE — ANESTHESIA PROCEDURE NOTES
Airway  Date/Time: 8/7/2024 4:48 PM  Urgency: elective    Airway not difficult    Staffing  Performed: CHALO   Authorized by: Osiel Kent MD    Performed by: CHALO Levy  Patient location during procedure: OR    Indications and Patient Condition  Indications for airway management: anesthesia  Spontaneous Ventilation: absent  Sedation level: deep  Preoxygenated: yes  Patient position: sniffing  Mask difficulty assessment: 1 - vent by mask  No planned trial extubation    Final Airway Details  Final airway type: endotracheal airway      Successful airway: ETT  Cuffed: yes   Successful intubation technique: direct laryngoscopy  Facilitating devices/methods: cricoid pressure  Endotracheal tube insertion site: oral  Blade: Inga  Blade size: #3  ETT size (mm): 7.0  Cormack-Lehane Classification: grade IIa - partial view of glottis  Placement verified by: chest auscultation and capnometry   Cuff volume (mL): 7  Measured from: lips  ETT to lips (cm): 21  Number of attempts at approach: 1

## 2024-08-07 NOTE — CONSULTS
Reason For Consult  Consults       History Of Present Illness  Dinora Lawler is a 69 y.o. female feeling poorly for a number of days and actually increased oral steroids assuming this is related to polymyalgia rheumatica.  Symptoms persisted prompting presentation to the emergency department.  Currently right lower quadrant pain.  No fevers chills.    Past Medical History  She has a past medical history of Encounter for immunization (05/28/2020), Fever, unspecified (04/13/2015), Other conditions influencing health status, Other general symptoms and signs (07/09/2013), Quadriplegia, unspecified (Multi), and Unspecified symptoms and signs involving the genitourinary system (02/25/2016).    Surgical History  She has a past surgical history that includes Other surgical history (09/30/2016); Cervical laminectomy (05/18/2015); and Breast biopsy.     Social History  She reports that she has never smoked. She has never used smokeless tobacco. She reports current alcohol use of about 2.0 standard drinks of alcohol per week. She reports that she does not use drugs.    Family History  Family History   Problem Relation Name Age of Onset    Coronary artery disease Mother      Arthritis Father      Hypertension Father      Coronary artery disease Brother      Breast cancer Mother's Sister      Breast cancer Father's Sister          Allergies  Adhesive tape-silicones and Amoxicillin-pot clavulanate    Review of Systems  Review of Systems   All other systems reviewed and are negative.        Physical Exam  Physical Exam  Constitutional:       Appearance: Normal appearance.   HENT:      Head: Normocephalic.   Cardiovascular:      Rate and Rhythm: Normal rate and regular rhythm.      Pulses: Normal pulses.   Pulmonary:      Effort: Pulmonary effort is normal.   Abdominal:      General: Bowel sounds are normal.      Palpations: Abdomen is soft.      Comments: Tenderness palpation right lower quadrant, palpable mass consistent with   "phlegmon   Musculoskeletal:         General: Normal range of motion.   Skin:     General: Skin is warm.   Neurological:      General: No focal deficit present.      Mental Status: She is alert.   Psychiatric:         Mood and Affect: Mood normal.         Behavior: Behavior normal.           Last Recorded Vitals  Blood pressure (!) 140/96, pulse 92, temperature 36.8 °C (98.3 °F), temperature source Oral, resp. rate 17, height 1.702 m (5' 7\"), weight 74.4 kg (164 lb), SpO2 94%.    Relevant Results  ECG 12 lead    Result Date: 8/7/2024  Normal sinus rhythm Possible Left atrial enlargement Borderline ECG When compared with ECG of 21-AUG-2023 09:27, Vent. rate has increased BY  34 BPM Nonspecific T wave abnormality now evident in Inferior leads    CT abdomen pelvis w IV contrast    Result Date: 8/6/2024  Interpreted By:  Jamila Alfonso, STUDY: CT ABDOMEN PELVIS W IV CONTRAST;  8/6/2024 10:00 pm   INDICATION: Signs/Symptoms:RLQ pain.   COMPARISON: None.   ACCESSION NUMBER(S): [id number]   ORDERING CLINICIAN: RUSTY MAYO   TECHNIQUE: Axial CT images of the abdomen and pelvis with coronal and sagittal reconstructed images obtained after intravenous administration of contrast   FINDINGS: LOWER CHEST: No acute abnormality of the lung bases. BONES: No acute osseous abnormality. ABDOMINAL WALL: Within normal limits.   ABDOMEN:   LIVER: Hypodensity adjacent to the falciform ligament, likely focal fatty infiltration. BILE DUCTS: No biliary dilatation. GALLBLADDER: No calcified gallstones. No pericholecystic inflammatory changes. PANCREAS: Within normal limits. SPLEEN: Within normal limits. ADRENALS: Within normal limits. KIDNEYS and URETERS: Symmetric renal enhancement. No hydronephrosis or perinephric fluid collection. Cortical hypodense lesions measuring up to 14 mm in the left kidney are most likely simple cysts.     VESSELS: No aortic aneurysm. RETROPERITONEUM: No pathologically enlarged retroperitoneal lymph nodes.   " PELVIS:   REPRODUCTIVE ORGANS: No pelvic masses. BLADDER: Within normal limits.   BOWEL: The stomach is not distended. No dilated small bowel.  The appendix is enlarged and the mucosa is distinct. There is the fluid collection surrounding the appendix measuring 5 x 4 cm in transverse dimension, with surrounding fat stranding. Reactive wall thickening of the cecum and terminal ileum is noted. PERITONEUM: Small amount of free pelvic fluid. No pneumoperitoneum.       Pericecal inflammatory changes most consistent with ruptured acute appendicitis and 5 x 4 cm periappendiceal abscess.   Small amount of free fluid in the right lower quadrant and pelvis.     MACRO: None   Signed by: Jamila Alfonso 8/6/2024 11:51 PM Dictation workstation:   [id number]    MR cardiac angio chest w and wo IV contrast for morph FUNCT valve DZ and GREAT vessels    Result Date: 8/6/2024  Interpreted By:  Leoncio Staley, STUDY: MR CARDIAC ANGIO CHEST W AND WO IV CONTRAST FOR MORPH FUNCT VALVE DZ AND GREAT VESSELS;  8/6/2024 8:19 am   INDICATION: Signs/Symptoms:monitor TAA 4.5 cm.   This study is performed to assess myocardial viability and damage, and to quantitate left ventricular and valvular function.   COMPARISON: CT dated 08/04/2022   ACCESSION NUMBER(S): [id number]   ORDERING CLINICIAN: SONDRA GARZON   TECHNIQUE: Siemens1.5  Ariadne MRI scanner. Turbo spin echo and balanced steady state free precession (bSSFP) imaging for anatomic definition. Dynamic cine bSSFP for cardiac chamber and wall-motion analysis, and valvular analysis. Flow quantification sequences for hemodynamics. Delayed gadolinium enhancement analysis after injection of gadolinium-chelate (30 mL Dotarem, 0.2 mmol/kg).   FINDINGS: CARDIAC CHAMBERS Normal atrioventricular and ventriculoarterial concordance   LEFT ATRIUM Normal size   RIGHT ATRIUM Normal size   INTERATRIAL SEPTUM Intact.   LEFT VENTRICLE The left ventricle is normal in size and shape, and has normal global systolic  "function. There are no segmental wall motion abnormalities. Quantitative left ventricular functional values are as follows: EDV = 129 cc; EDVi = 68 cc/m2 ESV = 40 cc; ESVi = 21 cc/m2 Stroke volume = 89 cc; SVi = 47 cc/m2 LVEF = 69 % Absolute Cardiac Output = 8.19 l/min.; COi = 4.33 l/min/m2 LV mass = 108 gm; LVMi = 57 gm/m2   *Jed GERBER et al. Normalized left ventricular systolic and diastolic function by steady state free precession cardiovascular magnetic resonance. J Cardiovasc Magn Reson 2006; 8:417-26.   Delayed-enhancement imaging reveals uniformly \"nulled\" myocardium, signifying that there has been no prior ischemic myocardial damage. There is also no definite evidence of interstitial fibrosis to suggest an infiltrative process. Myocardial T1 and T2 mapping times are normal.   RIGHT VENTRICLE The right ventricle appears normal in size, shape, and has normal qualitative systolic function. No segmental wall motion abnormalities. No abnormal delayed enhancement in the myocardium.   INTERVENTRICULAR SEPTUM Intact.   AORTIC VALVE There is  trace aortic regurgitation. Flow quantification through the ascending aorta: Forward volume =78 cc/beat Reverse volume = 5 cc/beat Net forward volume = 73 cc/beat Aortic regurgitant fraction = 7 %   MITRAL VALVE There is  no mitral regurgitation.   TRICUSPID VALVE There is qualitative no tricuspid regurgitation.   THORACIC AORTA The thoracic aorta appears normal in course, caliber, and contour. There is no evidence for acute aortic pathology. The arch vessel branching pattern is normal. All the arch branch vessels appear widely patent in their proximal portions. The visualized upper abdominal aorta is within normal limits.   REPRESENTATIVE MEASUREMENTS OF THE AORTA: Annulus 3.1 x 2.3 cm Root (Sinus of Valsalva) 3.4 cm (cusp to commissure) Sinotubular junction 3.4 cm Mid ascending 4.5 cm Distal ascending 3.9 cm Mid transverse arch 2.9 cm Isthmus 2.6 cm Mid descending 2.1 cm " Distal descending (hiatus) 2.2 cm   PULMONARY ARTERIES The central pulmonary arteries appear  normal.   SYSTEMIC AND PULMONARY VEINS Normal systemic venous and pulmonary venous return. The SVC and IVC are of normal caliber. Normal pulmonary venous anatomy.   CHEST The chest wall is normal. No significant lymphadenopathy or mass is seen in limited images of the mediastinum. Limited imaging through the lungs reveals no gross abnormalities. No pleural effusion.   UPPER ABDOMEN There is a nonenhancing cyst in the left kidney.       1.  The left ventricle is normal in size, shape, and has normal global systolic function. LVEF = 69%. There are no segmental wall motion abnormalities.  Quantitative values are as noted above. 2.  There are no findings to suggest prior ischemic damage or an infiltrative process. 3.  Trace aortic valve regurgitation (regurgitant fraction = 7%). 4. Aneurysm of the ascending aorta measuring up to 4.5 cm which is stable from prior exam. Remaining aorta is normal in course and caliber. Recommend continued imaging surveillance as per clinical guidelines.     MACRO: None   Signed by: Leoncio Staley 8/6/2024 9:48 AM Dictation workstation:   [id number]       Assessment/Plan   Problem List Items Addressed This Visit          Gastrointestinal and Abdominal    * (Principal) Right lower quadrant abdominal pain - Primary    Relevant Orders    Case Request Operating Room: Appendectomy Laparoscopy (Completed)    Ruptured appendicitis    Relevant Orders    Case Request Operating Room: Appendectomy Laparoscopy (Completed)      Perforated appendicitis in the setting of oral steroids.  Symptoms for at least 3 to 4 days.  Discussed with interventional radiology who did not think this is amenable to drainage based on anatomy of the cecum.  On my read fluid component is at least 3.5 cm in diameter, phlegmonous component approximately 5.5.  Plan for laparoscopic appendectomy, drainage of intra-abdominal abscess,  possible ileocecectomy depending on intraoperative findings.  Risk benefits of surgery discussed with patient and her  who agreed to proceed.      I spent 30 minutes in the professional and overall care of this patient.      Pravin Multani MD

## 2024-08-07 NOTE — PROGRESS NOTES
08/07/24 0803   [facility] Disability Status   Are you deaf or do you have serious difficulty hearing? N   Are you blind or do you have serious difficulty seeing, even when wearing glasses? N   Because of a physical, mental, or emotional condition, do you have serious difficulty concentrating, remembering, or making decisions? (5 years old or older) N   Do you have serious difficulty walking or climbing stairs? N   Do you have serious difficulty dressing or bathing? N   Because of a physical, mental, or emotional condition, do you have serious difficulty doing errands alone such as visiting the doctor? N

## 2024-08-07 NOTE — ED TRIAGE NOTES
Patient stated she has developed right lower quadrant pain and stated she had been constipated for 5 days.

## 2024-08-08 ENCOUNTER — APPOINTMENT (OUTPATIENT)
Dept: CARDIOLOGY | Facility: CLINIC | Age: 69
End: 2024-08-08
Payer: COMMERCIAL

## 2024-08-08 ENCOUNTER — APPOINTMENT (OUTPATIENT)
Dept: CARDIOLOGY | Facility: HOSPITAL | Age: 69
DRG: 398 | End: 2024-08-08
Payer: COMMERCIAL

## 2024-08-08 ENCOUNTER — APPOINTMENT (OUTPATIENT)
Dept: RADIOLOGY | Facility: HOSPITAL | Age: 69
DRG: 398 | End: 2024-08-08
Payer: COMMERCIAL

## 2024-08-08 LAB
ANION GAP SERPL CALC-SCNC: 15 MMOL/L (ref 10–20)
BUN SERPL-MCNC: 7 MG/DL (ref 6–23)
CALCIUM SERPL-MCNC: 8.1 MG/DL (ref 8.6–10.3)
CHLORIDE SERPL-SCNC: 101 MMOL/L (ref 98–107)
CO2 SERPL-SCNC: 23 MMOL/L (ref 21–32)
CREAT SERPL-MCNC: 0.47 MG/DL (ref 0.5–1.05)
EGFRCR SERPLBLD CKD-EPI 2021: >90 ML/MIN/1.73M*2
ERYTHROCYTE [DISTWIDTH] IN BLOOD BY AUTOMATED COUNT: 12.7 % (ref 11.5–14.5)
GLUCOSE SERPL-MCNC: 90 MG/DL (ref 74–99)
HCT VFR BLD AUTO: 35.5 % (ref 36–46)
HGB BLD-MCNC: 11.5 G/DL (ref 12–16)
MCH RBC QN AUTO: 29.5 PG (ref 26–34)
MCHC RBC AUTO-ENTMCNC: 32.4 G/DL (ref 32–36)
MCV RBC AUTO: 91 FL (ref 80–100)
NRBC BLD-RTO: 0 /100 WBCS (ref 0–0)
PLATELET # BLD AUTO: 408 X10*3/UL (ref 150–450)
POTASSIUM SERPL-SCNC: 4.4 MMOL/L (ref 3.5–5.3)
RBC # BLD AUTO: 3.9 X10*6/UL (ref 4–5.2)
SODIUM SERPL-SCNC: 135 MMOL/L (ref 136–145)
WBC # BLD AUTO: 13.1 X10*3/UL (ref 4.4–11.3)

## 2024-08-08 PROCEDURE — 85027 COMPLETE CBC AUTOMATED: CPT | Performed by: SURGERY

## 2024-08-08 PROCEDURE — 2500000001 HC RX 250 WO HCPCS SELF ADMINISTERED DRUGS (ALT 637 FOR MEDICARE OP): Performed by: SURGERY

## 2024-08-08 PROCEDURE — 2500000001 HC RX 250 WO HCPCS SELF ADMINISTERED DRUGS (ALT 637 FOR MEDICARE OP)

## 2024-08-08 PROCEDURE — 74018 RADEX ABDOMEN 1 VIEW: CPT

## 2024-08-08 PROCEDURE — 36415 COLL VENOUS BLD VENIPUNCTURE: CPT | Performed by: SURGERY

## 2024-08-08 PROCEDURE — 99231 SBSQ HOSP IP/OBS SF/LOW 25: CPT

## 2024-08-08 PROCEDURE — 2500000002 HC RX 250 W HCPCS SELF ADMINISTERED DRUGS (ALT 637 FOR MEDICARE OP, ALT 636 FOR OP/ED): Performed by: SURGERY

## 2024-08-08 PROCEDURE — 2500000004 HC RX 250 GENERAL PHARMACY W/ HCPCS (ALT 636 FOR OP/ED)

## 2024-08-08 PROCEDURE — 93005 ELECTROCARDIOGRAM TRACING: CPT

## 2024-08-08 PROCEDURE — 2500000004 HC RX 250 GENERAL PHARMACY W/ HCPCS (ALT 636 FOR OP/ED): Performed by: SURGERY

## 2024-08-08 PROCEDURE — 99233 SBSQ HOSP IP/OBS HIGH 50: CPT | Performed by: INTERNAL MEDICINE

## 2024-08-08 PROCEDURE — 80048 BASIC METABOLIC PNL TOTAL CA: CPT | Performed by: SURGERY

## 2024-08-08 PROCEDURE — 1200000002 HC GENERAL ROOM WITH TELEMETRY DAILY

## 2024-08-08 PROCEDURE — 2500000004 HC RX 250 GENERAL PHARMACY W/ HCPCS (ALT 636 FOR OP/ED): Mod: JZ | Performed by: SURGERY

## 2024-08-08 PROCEDURE — 99232 SBSQ HOSP IP/OBS MODERATE 35: CPT

## 2024-08-08 RX ORDER — KETOROLAC TROMETHAMINE 30 MG/ML
15 INJECTION, SOLUTION INTRAMUSCULAR; INTRAVENOUS EVERY 6 HOURS
Status: COMPLETED | OUTPATIENT
Start: 2024-08-08 | End: 2024-08-09

## 2024-08-08 RX ORDER — PROCHLORPERAZINE EDISYLATE 5 MG/ML
10 INJECTION INTRAMUSCULAR; INTRAVENOUS EVERY 6 HOURS PRN
Status: DISCONTINUED | OUTPATIENT
Start: 2024-08-08 | End: 2024-08-11 | Stop reason: HOSPADM

## 2024-08-08 RX ADMIN — ZOLPIDEM TARTRATE 5 MG: 5 TABLET, COATED ORAL at 21:36

## 2024-08-08 RX ADMIN — PROCHLORPERAZINE EDISYLATE 10 MG: 5 INJECTION INTRAMUSCULAR; INTRAVENOUS at 16:12

## 2024-08-08 RX ADMIN — METOPROLOL SUCCINATE 25 MG: 25 TABLET, EXTENDED RELEASE ORAL at 09:06

## 2024-08-08 RX ADMIN — SODIUM CHLORIDE, POTASSIUM CHLORIDE, SODIUM LACTATE AND CALCIUM CHLORIDE 100 ML/HR: 600; 310; 30; 20 INJECTION, SOLUTION INTRAVENOUS at 01:52

## 2024-08-08 RX ADMIN — KETOROLAC TROMETHAMINE 15 MG: 30 INJECTION, SOLUTION INTRAMUSCULAR at 21:36

## 2024-08-08 RX ADMIN — METRONIDAZOLE 500 MG: 500 INJECTION, SOLUTION INTRAVENOUS at 15:32

## 2024-08-08 RX ADMIN — SODIUM CHLORIDE, POTASSIUM CHLORIDE, SODIUM LACTATE AND CALCIUM CHLORIDE 100 ML/HR: 600; 310; 30; 20 INJECTION, SOLUTION INTRAVENOUS at 11:38

## 2024-08-08 RX ADMIN — ONDANSETRON 4 MG: 2 INJECTION INTRAMUSCULAR; INTRAVENOUS at 13:30

## 2024-08-08 RX ADMIN — PANTOPRAZOLE SODIUM 40 MG: 40 TABLET, DELAYED RELEASE ORAL at 06:00

## 2024-08-08 RX ADMIN — KETOROLAC TROMETHAMINE 15 MG: 30 INJECTION, SOLUTION INTRAMUSCULAR at 16:12

## 2024-08-08 RX ADMIN — OXYCODONE HYDROCHLORIDE 5 MG: 5 TABLET ORAL at 06:04

## 2024-08-08 RX ADMIN — METRONIDAZOLE 500 MG: 500 INJECTION, SOLUTION INTRAVENOUS at 09:07

## 2024-08-08 RX ADMIN — CIPROFLOXACIN 400 MG: 400 INJECTION, SOLUTION INTRAVENOUS at 10:35

## 2024-08-08 RX ADMIN — METRONIDAZOLE 500 MG: 500 INJECTION, SOLUTION INTRAVENOUS at 01:53

## 2024-08-08 RX ADMIN — LEVOTHYROXINE SODIUM 100 MCG: 0.1 TABLET ORAL at 06:00

## 2024-08-08 ASSESSMENT — COGNITIVE AND FUNCTIONAL STATUS - GENERAL
TURNING FROM BACK TO SIDE WHILE IN FLAT BAD: A LITTLE
PERSONAL GROOMING: A LITTLE
WALKING IN HOSPITAL ROOM: A LITTLE
DAILY ACTIVITIY SCORE: 22
MOBILITY SCORE: 18
CLIMB 3 TO 5 STEPS WITH RAILING: A LITTLE
TOILETING: A LITTLE
STANDING UP FROM CHAIR USING ARMS: A LITTLE
MOVING TO AND FROM BED TO CHAIR: A LITTLE
MOVING FROM LYING ON BACK TO SITTING ON SIDE OF FLAT BED WITH BEDRAILS: A LITTLE

## 2024-08-08 ASSESSMENT — PAIN SCALES - GENERAL
PAINLEVEL_OUTOF10: 5 - MODERATE PAIN
PAINLEVEL_OUTOF10: 3
PAINLEVEL_OUTOF10: 4
PAINLEVEL_OUTOF10: 4
PAINLEVEL_OUTOF10: 0 - NO PAIN

## 2024-08-08 ASSESSMENT — PAIN - FUNCTIONAL ASSESSMENT
PAIN_FUNCTIONAL_ASSESSMENT: 0-10

## 2024-08-08 ASSESSMENT — PAIN DESCRIPTION - DESCRIPTORS: DESCRIPTORS: ACHING;CRAMPING

## 2024-08-08 ASSESSMENT — PAIN SCALES - PAIN ASSESSMENT IN ADVANCED DEMENTIA (PAINAD): TOTALSCORE: MEDICATION (SEE MAR)

## 2024-08-08 NOTE — PROGRESS NOTES
"Dinora Lawler is a 69 y.o. female on day 1 of admission presenting with Right lower quadrant abdominal pain.    Subjective   Tolerated surgery well yesterday. Pain pretty well controlled.  at the bedside. Would like gabriel to stay in place while she is not moving much.        Objective     VITALS  Blood pressure 122/83, pulse 98, temperature 36.1 °C (97 °F), temperature source Temporal, resp. rate 16, height 1.702 m (5' 7\"), weight 74.4 kg (164 lb), SpO2 94%.  Physical Exam  Vitals and nursing note reviewed.   Constitutional:       General: She is not in acute distress.     Appearance: Normal appearance.   HENT:      Head: Normocephalic and atraumatic.   Cardiovascular:      Rate and Rhythm: Normal rate and regular rhythm.      Heart sounds: Normal heart sounds.   Pulmonary:      Effort: Pulmonary effort is normal. No respiratory distress.      Breath sounds: Normal breath sounds. No wheezing.   Abdominal:      General: Abdomen is flat. Bowel sounds are normal. There is no distension.      Palpations: Abdomen is soft.      Tenderness: There is no abdominal tenderness.      Comments: SHEILA drain with pink drainage    Genitourinary:     Comments: Gabriel in place   Musculoskeletal:         General: No swelling or tenderness. Normal range of motion.   Skin:     General: Skin is warm and dry.      Capillary Refill: Capillary refill takes less than 2 seconds.   Neurological:      General: No focal deficit present.      Mental Status: She is alert and oriented to person, place, and time.   Psychiatric:         Mood and Affect: Mood normal.           Intake/Output last 3 Shifts:  I/O last 3 completed shifts:  In: 2500 (33.6 mL/kg) [P.O.:480; I.V.:1170 (15.7 mL/kg); IV Piggyback:850]  Out: 1555 (20.9 mL/kg) [Urine:1300 (0.5 mL/kg/hr); Drains:205; Blood:50]  Weight: 74.4 kg     Relevant Results  Results for orders placed or performed during the hospital encounter of 08/06/24 (from the past 24 hour(s))   CBC   Result Value Ref " Range    WBC 13.1 (H) 4.4 - 11.3 x10*3/uL    nRBC 0.0 0.0 - 0.0 /100 WBCs    RBC 3.90 (L) 4.00 - 5.20 x10*6/uL    Hemoglobin 11.5 (L) 12.0 - 16.0 g/dL    Hematocrit 35.5 (L) 36.0 - 46.0 %    MCV 91 80 - 100 fL    MCH 29.5 26.0 - 34.0 pg    MCHC 32.4 32.0 - 36.0 g/dL    RDW 12.7 11.5 - 14.5 %    Platelets 408 150 - 450 x10*3/uL   Basic Metabolic Panel   Result Value Ref Range    Glucose 90 74 - 99 mg/dL    Sodium 135 (L) 136 - 145 mmol/L    Potassium 4.4 3.5 - 5.3 mmol/L    Chloride 101 98 - 107 mmol/L    Bicarbonate 23 21 - 32 mmol/L    Anion Gap 15 10 - 20 mmol/L    Urea Nitrogen 7 6 - 23 mg/dL    Creatinine 0.47 (L) 0.50 - 1.05 mg/dL    eGFR >90 >60 mL/min/1.73m*2    Calcium 8.1 (L) 8.6 - 10.3 mg/dL       Imaging Results  CT abdomen pelvis w IV contrast   Final Result   Pericecal inflammatory changes most consistent with ruptured acute   appendicitis and 5 x 4 cm periappendiceal abscess.        Small amount of free fluid in the right lower quadrant and pelvis.             MACRO:   None        Signed by: Jamila Alfonso 8/6/2024 11:51 PM   Dictation workstation:   [id number]          Medications:  ciprofloxacin, 400 mg, intravenous, q12h  enoxaparin, 40 mg, subcutaneous, q24h  levothyroxine, 100 mcg, oral, Once per day on Monday Tuesday Wednesday Thursday Friday  metoprolol succinate XL, 25 mg, oral, Daily  metroNIDAZOLE, 500 mg, intravenous, q8h  pantoprazole, 40 mg, oral, Daily before breakfast   Or  pantoprazole, 40 mg, intravenous, Daily before breakfast  zolpidem, 5 mg, oral, Nightly       PRN medications: acetaminophen **OR** acetaminophen **OR** acetaminophen, fluticasone, ondansetron **OR** ondansetron, oxyCODONE, oxyCODONE, valACYclovir        Assessment/Plan          Principal Problem:    Right lower quadrant abdominal pain  Active Problems:    Ruptured appendicitis    Ruptured acute appendicitis with abscess, s/p lap Appy   -Cipro   -Metronidazole  -General Surgery  -Pain control  -Nausea control      Neurogenic bladder  -Patient may do self straight catheterizations, continue with gabriel until patient is more mobile. Remove at DC      Hypertension  -Metoprolol XL 50 mg orally daily     Hypothyroidism  -Levothyroxine 100 mcg orally daily    DVT Prophylaxis:  Lovenox subq    Disposition:  Likely DC tomorrow     Jimmy Avilez, DO [facility]

## 2024-08-08 NOTE — CARE PLAN
Problem: Pain - Adult  Goal: Verbalizes/displays adequate comfort level or baseline comfort level  Outcome: Progressing  Flowsheets (Taken 8/7/2024 4237)  Verbalizes/displays adequate comfort level or baseline comfort level:   Encourage patient to monitor pain and request assistance   Assess pain using appropriate pain scale   Administer analgesics based on type and severity of pain and evaluate response   The patient's goals for the shift include  control pain    The clinical goals for the shift include pain control

## 2024-08-08 NOTE — DISCHARGE INSTRUCTIONS
Instructions After Laparoscopic Surgery    Wound Care:  - No baths or swimming until follow up visit  - Keep wounds clean and dry  - Do not apply topical creams or ointments  - Call if you notice redness around wound, foul-smelliing drainage, or increasing pain     Diet:          - Ok to resume your regular diet as tolerated     Activity          - Take it easy for the first 48 hours          - Stairs and walks are fine          - Resume activities gradually over the first week          - Ask Dr Multani before resuming strenuous physical exertions          - No driving while on pain medication    Medications          - Pain medicine prescription attached for severe pain, if needed          - Please take Motrin/ Ibuprofen 600 mg every 6 hours scheduled for 2-3 days, then every 6 hours as needed          - Please take Tylenol 625 mg every 6 hours scheduled for 2-3 days, then every 6 hours as needed          - Resume your home medications unless otherwise directed          - Oxycodone 5mg every 6 hours as needed for pain, if needed    Other Instructions          - Call to make appointment within 1-2 days: [phone]          - Call the doctor for the following:   Severe unrelieved pain   Fevers > 101F   Nausea/vomiting   Wound issues   Insurance/return to work forms   Shortness of breath   Chest pains    Other Instructions:  It is important to drink adequate fluid and avoid dehydration. Signs of dehydration include dark urine, decreased frequency in urine, pale mucous membrane, or dry mucous membranes. You should drink at least 8 8oz glasses of fluids a day and it should be a variety of fluids (water, juice, tea, coffee, etc.).  If you start to experience nausea, emesis, fever, or abdominal pain please call Dr. Multani's office [phone].

## 2024-08-08 NOTE — PROGRESS NOTES
"Dinora Lawler is a 69 y.o. female on day 1 of admission presenting with Right lower quadrant abdominal pain.  Found to have ruptured appendicitis, now POD#1 laparoscopic appendectomy, findings significant for perforated appendicitis, SHEILA drain placed in the OR with SS drainage.    Subjective   Patient feeling exhausted today, did ambulate in the room this morning, tolerating clear liquids without N/V, has minimal appetite, hasn't eaten since OR- going to try breakfast this morning. Gabriel catheter remains in place, yellow urine.        Objective   PE:  Constitutional: A&Ox3, calm and cooperative  Eyes: clear sclera   ENMT: Moist mucous membranes  Head/Neck: Neck supple  Cardiovascular: Normal rate and regular rhythm.  Respiratory/Thorax: Good symmetric chest expansion.   Gastrointestinal: Abdomen slightly distended, soft, appropriately tender, no peritoneal signs, laparotomy sites c/d/i, well approximate with Dermabond/steristrips, no erythema or drainage. SHEILA drain in place as above- stripped at the bedside.  Genitourinary: gabriel catheter vs straight cath (chronic retention)  Musculoskeletal: ROM intact  Extremities: No peripheral edema  Neurological: A&Ox3,  Psychological: Appropriate mood and behavior  Skin: Warm and dry      Last Recorded Vitals  Blood pressure 131/88, pulse 86, temperature 36.7 °C (98.1 °F), temperature source Temporal, resp. rate 16, height 1.702 m (5' 7\"), weight 74.4 kg (164 lb), SpO2 94%.  Intake/Output last 3 Shifts:  I/O last 3 completed shifts:  In: 2500 (33.6 mL/kg) [P.O.:480; I.V.:1170 (15.7 mL/kg); IV Piggyback:850]  Out: 1555 (20.9 mL/kg) [Urine:1300 (0.5 mL/kg/hr); Drains:205; Blood:50]  Weight: 74.4 kg     Relevant Results  Results for orders placed or performed during the hospital encounter of 08/06/24 (from the past 24 hour(s))   CBC   Result Value Ref Range    WBC 13.1 (H) 4.4 - 11.3 x10*3/uL    nRBC 0.0 0.0 - 0.0 /100 WBCs    RBC 3.90 (L) 4.00 - 5.20 x10*6/uL    Hemoglobin 11.5 (L) " 12.0 - 16.0 g/dL    Hematocrit 35.5 (L) 36.0 - 46.0 %    MCV 91 80 - 100 fL    MCH 29.5 26.0 - 34.0 pg    MCHC 32.4 32.0 - 36.0 g/dL    RDW 12.7 11.5 - 14.5 %    Platelets 408 150 - 450 x10*3/uL   Basic Metabolic Panel   Result Value Ref Range    Glucose 90 74 - 99 mg/dL    Sodium 135 (L) 136 - 145 mmol/L    Potassium 4.4 3.5 - 5.3 mmol/L    Chloride 101 98 - 107 mmol/L    Bicarbonate 23 21 - 32 mmol/L    Anion Gap 15 10 - 20 mmol/L    Urea Nitrogen 7 6 - 23 mg/dL    Creatinine 0.47 (L) 0.50 - 1.05 mg/dL    eGFR >90 >60 mL/min/1.73m*2    Calcium 8.1 (L) 8.6 - 10.3 mg/dL     CT abdomen pelvis w IV contrast   Final Result   Pericecal inflammatory changes most consistent with ruptured acute   appendicitis and 5 x 4 cm periappendiceal abscess.        Small amount of free fluid in the right lower quadrant and pelvis.             MACRO:   None        Signed by: Jamila Alfonso 8/6/2024 11:51 PM   Dictation workstation:   [id number]             Assessment/Plan   Principal Problem:    Right lower quadrant abdominal pain  Active Problems:    Ruptured appendicitis    Dinora Lawler is a 69 y.o. female on day 1 of admission presenting with Right lower quadrant abdominal pain.  Found to have ruptured appendicitis, now POD#1 laparoscopic appendectomy, findings significant for perforated appendicitis, SHEILA drain placed in the OR with SS drainage.    Plan:  - regular diet as patient tolerates  - discontinue IVF when tolerating adequate PO intake  - remove gabrile catheter vs self  straight cath as per primary team  - PRN pain medications  - PRN antiemetics  - Continue IV antibiotics --> likely PO at discharge  - Daily PPI  - DVT proph; SCD's/Lovenox  - OOB as much as possible/ambulate   - Maintain SHEILA draim,  monitor output Qshift  - Keep incisions CDI  - Lab work/ VS reviewed- stable       Dispo: Discussed with Dr. Multani, continue to monitor drain, patient to increase PO intake prior to discharge, would anticipate discharge in the next  24 hours.    I spent 35 minutes in the professional and overall care of this patient.      Angeles Quigley, APRN-CNP  Nausea with emesis today, likely postoperative ileus given intraoperative contamination  KUB pending, hesitant for NG tube  SHEILA appears sanguinous but nonpurulent  Ongoing antibiotics

## 2024-08-08 NOTE — CARE PLAN
The patient's goals for the shift include      The clinical goals for the shift include patient to remain comfortable this shift      Problem: Pain - Adult  Goal: Verbalizes/displays adequate comfort level or baseline comfort level  8/8/2024 1141 by Latonia Leonard RN  Outcome: Progressing  8/8/2024 1140 by Latonia Leonard RN  Outcome: Progressing     Problem: Safety - Adult  Goal: Free from fall injury  8/8/2024 1141 by Latonia Leonard RN  Outcome: Progressing  8/8/2024 1140 by Latonia Leonard RN  Outcome: Progressing     Problem: Chronic Conditions and Co-morbidities  Goal: Patient's chronic conditions and co-morbidity symptoms are monitored and maintained or improved  8/8/2024 1141 by Latonia Leonard RN  Outcome: Progressing  8/8/2024 1140 by Latonia Leonard RN  Outcome: Progressing     Problem: Pain  Goal: Takes deep breaths with improved pain control throughout the shift  8/8/2024 1141 by Latonia Leonard RN  Outcome: Progressing  8/8/2024 1140 by Latonia Leonard RN  Outcome: Progressing

## 2024-08-08 NOTE — CARE PLAN
The patient's goals for the shift include  remain comfortable     The clinical goals for the shift include decrease n/v        Problem: Pain - Adult  Goal: Verbalizes/displays adequate comfort level or baseline comfort level  Outcome: Progressing     Problem: Safety - Adult  Goal: Free from fall injury  Outcome: Progressing

## 2024-08-08 NOTE — POST-PROCEDURE NOTE
Ms. Lawler is a 69 year old female who is POD #0 from a laparoscopic appendectomy (Intraoperative Findings: Perforated appendicitis). She is feeling better than she was pre-operatively. She does endorse abdominal discomfort upon movement. She denies any nausea or vomiting. She does have a SHEILA drain and gabriel catheter in place at the time of my assessment.    PE:  Constitutional: A&Ox3, calm and cooperative, NAD.  Eyes: PERRL, clear sclera.  ENMT: Moist mucous membranes.  Head/Neck: Neck supple.  Cardiovascular: Normal rate and regular rhythm.   Respiratory/Thorax: Unlabored breathing.   Gastrointestinal: Abdomen slightly distended, soft, appropriately tender to pallpation, no peritoneal signs, laparotomy sites c/d/i, well approximated with Steri-Strips, no erythema or drainage. SHEILA drain present with sanguinous output in bulb.   Genitourinary: Gabriel catheter in place draining kasi colored urine.   Musculoskeletal: ROM intact.  Neurological: A&Ox3, No focal deficits.  Psychological: Appropriate mood and behavior.  Skin: Warm and dry.    Radiology and labs reviewed. VSS, afebrile.    Plan:   - Diet: Regular  - IVF  - Contiinue Cipro/Flagyl  - Continue current pain regimen   - PRN antiemetic   - DVT Proph: SCDs/ ambulate/ Lovenox  - Monitor VS every 4 hours   - Labs ordered for AM   - IS every hour while awake   - Encourage ambulation / OOB as tolerated   - SHEILA drain care every shift and as needed. Monitor and record output.   - Monitor and record gabriel catheter output  - Monitor lap sites for drainage, erythema, excessive bruising   - Continue supportive care  - F/u with Dr. Multani outpatient in 10-14 days once d/c from hospital     Dispo: Pain and nausea control as needed. OOB as able. Antibiotics.     Total time spent 25 minutes, and greater than 50% of time was spent in counseling/coordination of care.

## 2024-08-08 NOTE — PROGRESS NOTES
08/08/24 1145   Current Planned Discharge Disposition   Current Planned Discharge Disposition Home     Spoke to Dr Avilez for medical updates  Probable discharge Friday  Per gen surg plan for po abx at discharge    ADOD fri/sat  BARRIERS final abx plan  DISPO home

## 2024-08-09 LAB
ANION GAP SERPL CALC-SCNC: 11 MMOL/L (ref 10–20)
ATRIAL RATE: 93 BPM
BUN SERPL-MCNC: 9 MG/DL (ref 6–23)
CALCIUM SERPL-MCNC: 8.2 MG/DL (ref 8.6–10.3)
CHLORIDE SERPL-SCNC: 104 MMOL/L (ref 98–107)
CO2 SERPL-SCNC: 26 MMOL/L (ref 21–32)
CREAT SERPL-MCNC: 0.53 MG/DL (ref 0.5–1.05)
EGFRCR SERPLBLD CKD-EPI 2021: >90 ML/MIN/1.73M*2
ERYTHROCYTE [DISTWIDTH] IN BLOOD BY AUTOMATED COUNT: 12.8 % (ref 11.5–14.5)
GLUCOSE SERPL-MCNC: 91 MG/DL (ref 74–99)
HCT VFR BLD AUTO: 35.8 % (ref 36–46)
HGB BLD-MCNC: 11.6 G/DL (ref 12–16)
MCH RBC QN AUTO: 29.3 PG (ref 26–34)
MCHC RBC AUTO-ENTMCNC: 32.4 G/DL (ref 32–36)
MCV RBC AUTO: 90 FL (ref 80–100)
NRBC BLD-RTO: 0 /100 WBCS (ref 0–0)
P AXIS: 45 DEGREES
P OFFSET: 187 MS
P ONSET: 137 MS
PLATELET # BLD AUTO: 434 X10*3/UL (ref 150–450)
POTASSIUM SERPL-SCNC: 3.9 MMOL/L (ref 3.5–5.3)
PR INTERVAL: 164 MS
Q ONSET: 219 MS
QRS COUNT: 16 BEATS
QRS DURATION: 94 MS
QT INTERVAL: 362 MS
QTC CALCULATION(BAZETT): 450 MS
QTC FREDERICIA: 419 MS
R AXIS: 25 DEGREES
RBC # BLD AUTO: 3.96 X10*6/UL (ref 4–5.2)
SODIUM SERPL-SCNC: 137 MMOL/L (ref 136–145)
T AXIS: 20 DEGREES
T OFFSET: 400 MS
VENTRICULAR RATE: 93 BPM
WBC # BLD AUTO: 12.7 X10*3/UL (ref 4.4–11.3)

## 2024-08-09 PROCEDURE — 2500000001 HC RX 250 WO HCPCS SELF ADMINISTERED DRUGS (ALT 637 FOR MEDICARE OP): Performed by: SURGERY

## 2024-08-09 PROCEDURE — 2500000004 HC RX 250 GENERAL PHARMACY W/ HCPCS (ALT 636 FOR OP/ED): Mod: JZ | Performed by: SURGERY

## 2024-08-09 PROCEDURE — 1200000002 HC GENERAL ROOM WITH TELEMETRY DAILY

## 2024-08-09 PROCEDURE — 2500000002 HC RX 250 W HCPCS SELF ADMINISTERED DRUGS (ALT 637 FOR MEDICARE OP, ALT 636 FOR OP/ED): Performed by: SURGERY

## 2024-08-09 PROCEDURE — 2500000004 HC RX 250 GENERAL PHARMACY W/ HCPCS (ALT 636 FOR OP/ED)

## 2024-08-09 PROCEDURE — 80048 BASIC METABOLIC PNL TOTAL CA: CPT | Performed by: INTERNAL MEDICINE

## 2024-08-09 PROCEDURE — 99233 SBSQ HOSP IP/OBS HIGH 50: CPT | Performed by: INTERNAL MEDICINE

## 2024-08-09 PROCEDURE — 2500000004 HC RX 250 GENERAL PHARMACY W/ HCPCS (ALT 636 FOR OP/ED): Performed by: SURGERY

## 2024-08-09 PROCEDURE — 36415 COLL VENOUS BLD VENIPUNCTURE: CPT | Performed by: INTERNAL MEDICINE

## 2024-08-09 PROCEDURE — 85027 COMPLETE CBC AUTOMATED: CPT | Performed by: INTERNAL MEDICINE

## 2024-08-09 PROCEDURE — 99232 SBSQ HOSP IP/OBS MODERATE 35: CPT

## 2024-08-09 RX ADMIN — METRONIDAZOLE 500 MG: 500 INJECTION, SOLUTION INTRAVENOUS at 00:35

## 2024-08-09 RX ADMIN — CIPROFLOXACIN 400 MG: 400 INJECTION, SOLUTION INTRAVENOUS at 22:28

## 2024-08-09 RX ADMIN — METRONIDAZOLE 500 MG: 500 INJECTION, SOLUTION INTRAVENOUS at 08:55

## 2024-08-09 RX ADMIN — METRONIDAZOLE 500 MG: 500 INJECTION, SOLUTION INTRAVENOUS at 15:51

## 2024-08-09 RX ADMIN — METOPROLOL SUCCINATE 25 MG: 25 TABLET, EXTENDED RELEASE ORAL at 08:54

## 2024-08-09 RX ADMIN — KETOROLAC TROMETHAMINE 15 MG: 30 INJECTION, SOLUTION INTRAMUSCULAR at 11:10

## 2024-08-09 RX ADMIN — KETOROLAC TROMETHAMINE 15 MG: 30 INJECTION, SOLUTION INTRAMUSCULAR at 21:58

## 2024-08-09 RX ADMIN — LEVOTHYROXINE SODIUM 100 MCG: 0.1 TABLET ORAL at 06:21

## 2024-08-09 RX ADMIN — KETOROLAC TROMETHAMINE 15 MG: 30 INJECTION, SOLUTION INTRAMUSCULAR at 04:03

## 2024-08-09 RX ADMIN — PANTOPRAZOLE SODIUM 40 MG: 40 TABLET, DELAYED RELEASE ORAL at 06:21

## 2024-08-09 RX ADMIN — ZOLPIDEM TARTRATE 5 MG: 5 TABLET, COATED ORAL at 21:58

## 2024-08-09 RX ADMIN — CIPROFLOXACIN 400 MG: 400 INJECTION, SOLUTION INTRAVENOUS at 11:10

## 2024-08-09 ASSESSMENT — COGNITIVE AND FUNCTIONAL STATUS - GENERAL
MOVING FROM LYING ON BACK TO SITTING ON SIDE OF FLAT BED WITH BEDRAILS: A LITTLE
TURNING FROM BACK TO SIDE WHILE IN FLAT BAD: A LITTLE
CLIMB 3 TO 5 STEPS WITH RAILING: A LITTLE
WALKING IN HOSPITAL ROOM: A LITTLE
MOBILITY SCORE: 18
DAILY ACTIVITIY SCORE: 18
TOILETING: A LITTLE
EATING MEALS: A LITTLE
STANDING UP FROM CHAIR USING ARMS: A LITTLE
DRESSING REGULAR LOWER BODY CLOTHING: A LITTLE
PERSONAL GROOMING: A LITTLE
MOVING TO AND FROM BED TO CHAIR: A LITTLE
DRESSING REGULAR UPPER BODY CLOTHING: A LITTLE
HELP NEEDED FOR BATHING: A LITTLE

## 2024-08-09 ASSESSMENT — PAIN SCALES - GENERAL
PAINLEVEL_OUTOF10: 4
PAINLEVEL_OUTOF10: 1

## 2024-08-09 ASSESSMENT — PAIN DESCRIPTION - LOCATION: LOCATION: ABDOMEN

## 2024-08-09 NOTE — PROGRESS NOTES
"Dinora Lawler is a 69 y.o. female on day 2 of admission presenting with Right lower quadrant abdominal pain.    Subjective   Appears to have developed an ileus overnight. Otherwise comfortable. Ok with getting gabriel out today as she plans on walking around more today.        Objective     VITALS  Blood pressure 118/80, pulse 93, temperature 35.8 °C (96.5 °F), temperature source Temporal, resp. rate 16, height 1.702 m (5' 7\"), weight 74.4 kg (164 lb), SpO2 95%.  Physical Exam  Vitals and nursing note reviewed.   Constitutional:       General: She is not in acute distress.     Appearance: Normal appearance.   HENT:      Head: Normocephalic and atraumatic.   Cardiovascular:      Rate and Rhythm: Normal rate and regular rhythm.      Heart sounds: Normal heart sounds.   Pulmonary:      Effort: Pulmonary effort is normal. No respiratory distress.      Breath sounds: Normal breath sounds. No wheezing.   Abdominal:      General: Abdomen is flat. Bowel sounds are absent. There is no distension.      Palpations: Abdomen is soft.      Tenderness: There is no abdominal tenderness.      Comments: SHELIA drain with pink drainage    Genitourinary:     Comments: Gabriel in place   Musculoskeletal:         General: No swelling or tenderness. Normal range of motion.   Skin:     General: Skin is warm and dry.      Capillary Refill: Capillary refill takes less than 2 seconds.   Neurological:      General: No focal deficit present.      Mental Status: She is alert and oriented to person, place, and time.   Psychiatric:         Mood and Affect: Mood normal.           Intake/Output last 3 Shifts:  I/O last 3 completed shifts:  In: 650 (8.7 mL/kg) [P.O.:480; I.V.:170 (2.3 mL/kg)]  Out: 3580 (48.1 mL/kg) [Urine:2850 (1.1 mL/kg/hr); Drains:730]  Weight: 74.4 kg     Relevant Results  Results for orders placed or performed during the hospital encounter of 08/06/24 (from the past 24 hour(s))   CBC   Result Value Ref Range    WBC 12.7 (H) 4.4 - 11.3 " x10*3/uL    nRBC 0.0 0.0 - 0.0 /100 WBCs    RBC 3.96 (L) 4.00 - 5.20 x10*6/uL    Hemoglobin 11.6 (L) 12.0 - 16.0 g/dL    Hematocrit 35.8 (L) 36.0 - 46.0 %    MCV 90 80 - 100 fL    MCH 29.3 26.0 - 34.0 pg    MCHC 32.4 32.0 - 36.0 g/dL    RDW 12.8 11.5 - 14.5 %    Platelets 434 150 - 450 x10*3/uL   Basic Metabolic Panel   Result Value Ref Range    Glucose 91 74 - 99 mg/dL    Sodium 137 136 - 145 mmol/L    Potassium 3.9 3.5 - 5.3 mmol/L    Chloride 104 98 - 107 mmol/L    Bicarbonate 26 21 - 32 mmol/L    Anion Gap 11 10 - 20 mmol/L    Urea Nitrogen 9 6 - 23 mg/dL    Creatinine 0.53 0.50 - 1.05 mg/dL    eGFR >90 >60 mL/min/1.73m*2    Calcium 8.2 (L) 8.6 - 10.3 mg/dL       Imaging Results  XR abdomen 1 view   Final Result   1.  Ileus versus obstruction. Correlate with patient history.        MACRO:   None        Signed by: Ruddy Saha 8/8/2024 11:02 PM   Dictation workstation:   [id number]      CT abdomen pelvis w IV contrast   Final Result   Pericecal inflammatory changes most consistent with ruptured acute   appendicitis and 5 x 4 cm periappendiceal abscess.        Small amount of free fluid in the right lower quadrant and pelvis.             MACRO:   None        Signed by: Jamila Alfonso 8/6/2024 11:51 PM   Dictation workstation:   [id number]          Medications:  ciprofloxacin, 400 mg, intravenous, q12h  enoxaparin, 40 mg, subcutaneous, q24h  ketorolac, 15 mg, intravenous, q6h  levothyroxine, 100 mcg, oral, Once per day on Monday Tuesday Wednesday Thursday Friday  metoprolol succinate XL, 25 mg, oral, Daily  metroNIDAZOLE, 500 mg, intravenous, q8h  pantoprazole, 40 mg, oral, Daily before breakfast   Or  pantoprazole, 40 mg, intravenous, Daily before breakfast  zolpidem, 5 mg, oral, Nightly       PRN medications: acetaminophen **OR** acetaminophen **OR** acetaminophen, fluticasone, HYDROmorphone, ondansetron **OR** ondansetron, oxyCODONE, oxyCODONE, prochlorperazine, valACYclovir        Assessment/Plan           Principal Problem:    Right lower quadrant abdominal pain  Active Problems:    Ruptured appendicitis    Ruptured acute appendicitis with abscess, s/p lap Appy   -Cipro   -Metronidazole  -General Surgery  -Pain control  -Nausea control     Neurogenic bladder  -Remove gabriel today, continue with typical straight caths      Hypertension  -Metoprolol XL 50 mg orally daily     Hypothyroidism  -Levothyroxine 100 mcg orally daily    Post op ileus  -No NG for now but if symptoms worsen will likely need one  -Out of bed and ambulating as much as possible.   -Bowel rest     DVT Prophylaxis:  Lovenox subq    Disposition:  Awaiting return of bowel function     Jimmy vAilez, [facility]

## 2024-08-09 NOTE — PROGRESS NOTES
08/09/24 0859   Current Planned Discharge Disposition   Current Planned Discharge Disposition Home     Admitted for ruptured appendicitis, now POD#2 laparoscopic appendectomy, findings significant for perforated appendicitis, SHEILA drain placed in the OR with SS drainage.     KUB done now Ileus versus obstruction  Currently patient is NPO and surgery is monitoring    ADOD 2-4 days  BARRIERS clinical improvement  DISPO home

## 2024-08-09 NOTE — PROGRESS NOTES
"Dinora Lawler is a 69 y.o. female on day 2 of admission presenting with Right lower quadrant abdominal pain.  Found to have ruptured appendicitis, now POD#2 laparoscopic appendectomy, findings significant for perforated appendicitis, SHEILA drain placed in the OR with more serous drainage today. Patient began to experience increased N/V yesterday, KUB obtained with showed ileus, declined NGT overnight, continuing with bowel rest this morning.    Subjective   Patient denies N/V at this time, not yet passing gas, still a little bloated, gabriel catheter in place, encouraged to get OOB and ambulate as much as possible.        Objective   PE:  Constitutional: A&Ox3, calm and cooperative  Eyes: clear sclera   ENMT: Moist mucous membranes  Head/Neck: Neck supple  Cardiovascular: Normal rate and regular rhythm.  Respiratory/Thorax: Good symmetric chest expansion.   Gastrointestinal: Abdomen slightly distended, soft, appropriately tender, no peritoneal signs, laparotomy sites c/d/i, well approximate with Dermabond/steristrips, no erythema or drainage. SHEILA drain in place as above- stripped at the bedside.  Genitourinary: gabriel catheter vs straight cath (chronic retention)  Musculoskeletal: ROM intact  Extremities: No peripheral edema  Neurological: A&Ox3,  Psychological: Appropriate mood and behavior  Skin: Warm and dry      Last Recorded Vitals  Blood pressure 125/82, pulse 96, temperature 36.6 °C (97.8 °F), temperature source Temporal, resp. rate 16, height 1.702 m (5' 7\"), weight 74.4 kg (164 lb), SpO2 97%.  Intake/Output last 3 Shifts:  I/O last 3 completed shifts:  In: 650 (8.7 mL/kg) [P.O.:480; I.V.:170 (2.3 mL/kg)]  Out: 3580 (48.1 mL/kg) [Urine:2850 (1.1 mL/kg/hr); Drains:730]  Weight: 74.4 kg     Relevant Results  Results for orders placed or performed during the hospital encounter of 08/06/24 (from the past 24 hour(s))   CBC   Result Value Ref Range    WBC 12.7 (H) 4.4 - 11.3 x10*3/uL    nRBC 0.0 0.0 - 0.0 /100 WBCs    RBC " 3.96 (L) 4.00 - 5.20 x10*6/uL    Hemoglobin 11.6 (L) 12.0 - 16.0 g/dL    Hematocrit 35.8 (L) 36.0 - 46.0 %    MCV 90 80 - 100 fL    MCH 29.3 26.0 - 34.0 pg    MCHC 32.4 32.0 - 36.0 g/dL    RDW 12.8 11.5 - 14.5 %    Platelets 434 150 - 450 x10*3/uL   Basic Metabolic Panel   Result Value Ref Range    Glucose 91 74 - 99 mg/dL    Sodium 137 136 - 145 mmol/L    Potassium 3.9 3.5 - 5.3 mmol/L    Chloride 104 98 - 107 mmol/L    Bicarbonate 26 21 - 32 mmol/L    Anion Gap 11 10 - 20 mmol/L    Urea Nitrogen 9 6 - 23 mg/dL    Creatinine 0.53 0.50 - 1.05 mg/dL    eGFR >90 >60 mL/min/1.73m*2    Calcium 8.2 (L) 8.6 - 10.3 mg/dL     XR abdomen 1 view   Final Result   1.  Ileus versus obstruction. Correlate with patient history.        MACRO:   None        Signed by: Ruddy Saha 8/8/2024 11:02 PM   Dictation workstation:   [id number]      CT abdomen pelvis w IV contrast   Final Result   Pericecal inflammatory changes most consistent with ruptured acute   appendicitis and 5 x 4 cm periappendiceal abscess.        Small amount of free fluid in the right lower quadrant and pelvis.             MACRO:   None        Signed by: Jamila Alfonso 8/6/2024 11:51 PM   Dictation workstation:   [id number]             Assessment/Plan   Principal Problem:    Right lower quadrant abdominal pain  Active Problems:    Ruptured appendicitis    Dinora Lawler is a 69 y.o. female on day 2 of admission presenting with Right lower quadrant abdominal pain.  Found to have ruptured appendicitis, now POD#2 laparoscopic appendectomy, findings significant for perforated appendicitis, SHEILA drain placed in the OR with more serous drainage today. Patient began to experience increased N/V yesterday, KUB obtained with showed ileus, declined NGT overnight, continuing with bowel rest this morning.    Plan: POD#2 lap appy with post op ileus  - Npo with sips for comfort    - plan to place NGT if vomiting or worsening abdominal pain   - discontinue IVF when tolerating  adequate PO intake  - remove gabriel catheter vs self  straight cath as per primary team  - PRN pain medications  - PRN antiemetics  - Continue IV antibiotics --> likely PO at discharge  - Daily PPI  - DVT proph; SCD's/Lovenox  - OOB as much as possible/ambulate   - Maintain SHEILA draim,  monitor output Qshift --> stripped at bedside, 525ml recorded out over last 24 hrs, mostly serous   - Keep incisions CDI  - Lab work/ VS reviewed- stable       Dispo: Discussed with Dr. Multani, bowel rest, NGT if symptoms worsen, awaiting RBF.    I spent 35 minutes in the professional and overall care of this patient.      Angeles Quigley, APRN-CNP  No new emesis but also no flatus  Awaiting return of bowel function  Continue IV antibiotics  SHEILA serous, continue stripping  DVT prophylaxis in house

## 2024-08-09 NOTE — SIGNIFICANT EVENT
Dinora Lawler is a 69 y.o. female on day 1 of admission presenting with Right lower quadrant abdominal pain.  Found to have ruptured appendicitis, now POD#1 laparoscopic appendectomy, findings significant for perforated appendicitis, SHEILA drain placed in the OR with SS drainage.     Subjective: Resting in bed, feels better since receiving compazine and has not had any nausea or vomiting since 3 pm. Does feel like she is going to pass gas and has some rumbling in stomach. NPO. Denies any fever, chills, night sweats, CP, SOB, palpitations, diarrhea, constipation, dysuria, hematuria, hematochezia, hematemesis, flank pain.     Objective:  Constitutional: A&Ox3, calm and cooperative, NAD.  Eyes: PERRL, EOMI  ENMT: Moist mucous membranes, no apparent injuries or lesions.  Head/Neck: NC/AT.   Cardiovascular: Normal rate and regular rhythm. 2+ equal pulses of the distal extremities.  Respiratory/Thorax: CTAB, regular respirations on RA. Good symmetric chest expansion.   Gastrointestinal: Abdomen soft, slightly distended, + tympany to lower quadrants, appropriately tender, no peritoneal signs, +BS x 4, no BM, laparotomy sites c/d/i, well approximate with Dermabond/steristrips, no erythema or drainage, SHEILA drain with ss output,   Extremities: No peripheral edema. Moving all 4 extremities actively.   Neurological: A&Ox3.   Psychological: Appropriate mood and behavior.      XR abdomen 1 view    Result Date: 8/8/2024  Interpreted By:  Ruddy Saha, STUDY: XR ABDOMEN 1 VIEW;  8/8/2024 9:28 pm   INDICATION: Signs/Symptoms:Per radiology.   COMPARISON: 08/06/2024   ACCESSION NUMBER(S): [id number]   ORDERING CLINICIAN: EMY RUGGIERO   FINDINGS: New drain seen in the pelvis.   Numerous air-filled slightly dilated loops of small bowel are seen which may reflect a postoperative ileus in the appropriate clinical setting versus developing obstruction. There does appear to be some residual gas in the colon.       1.  Ileus versus obstruction.  Correlate with patient history.   MACRO: None   Signed by: Ruddy Saha 8/8/2024 11:02 PM Dictation workstation:   [id number]    A/P:  - KUB results: Ileus versus obstruction  - instructed patient on placement of NGT and highly encouraged, would like to hold off on NGT for now but does understand if symptoms worse she needs NGT  - encourage ambulation  - NPO  - chewing gum  - place NGT if worsening N/V/abd distention, patient agreeable to plan   - nurse updated at bedside  - continue to monitor  - Discussed with Dr. Rangel Contreras, APRN-CNP  [facility]  Available via [employer]

## 2024-08-10 LAB
ANION GAP SERPL CALC-SCNC: 13 MMOL/L (ref 10–20)
BUN SERPL-MCNC: 8 MG/DL (ref 6–23)
CALCIUM SERPL-MCNC: 7.5 MG/DL (ref 8.6–10.3)
CHLORIDE SERPL-SCNC: 104 MMOL/L (ref 98–107)
CO2 SERPL-SCNC: 22 MMOL/L (ref 21–32)
CREAT SERPL-MCNC: 0.47 MG/DL (ref 0.5–1.05)
EGFRCR SERPLBLD CKD-EPI 2021: >90 ML/MIN/1.73M*2
ERYTHROCYTE [DISTWIDTH] IN BLOOD BY AUTOMATED COUNT: 13.2 % (ref 11.5–14.5)
GLUCOSE SERPL-MCNC: 87 MG/DL (ref 74–99)
HCT VFR BLD AUTO: 38.1 % (ref 36–46)
HGB BLD-MCNC: 11.1 G/DL (ref 12–16)
MCH RBC QN AUTO: 28.9 PG (ref 26–34)
MCHC RBC AUTO-ENTMCNC: 29.1 G/DL (ref 32–36)
MCV RBC AUTO: 99 FL (ref 80–100)
NRBC BLD-RTO: 0 /100 WBCS (ref 0–0)
PLATELET # BLD AUTO: 352 X10*3/UL (ref 150–450)
POTASSIUM SERPL-SCNC: 3.6 MMOL/L (ref 3.5–5.3)
RBC # BLD AUTO: 3.84 X10*6/UL (ref 4–5.2)
SODIUM SERPL-SCNC: 135 MMOL/L (ref 136–145)
WBC # BLD AUTO: 11.2 X10*3/UL (ref 4.4–11.3)

## 2024-08-10 PROCEDURE — 99232 SBSQ HOSP IP/OBS MODERATE 35: CPT | Performed by: INTERNAL MEDICINE

## 2024-08-10 PROCEDURE — 2500000004 HC RX 250 GENERAL PHARMACY W/ HCPCS (ALT 636 FOR OP/ED): Performed by: SURGERY

## 2024-08-10 PROCEDURE — 85027 COMPLETE CBC AUTOMATED: CPT

## 2024-08-10 PROCEDURE — 80048 BASIC METABOLIC PNL TOTAL CA: CPT

## 2024-08-10 PROCEDURE — 2500000001 HC RX 250 WO HCPCS SELF ADMINISTERED DRUGS (ALT 637 FOR MEDICARE OP): Performed by: SURGERY

## 2024-08-10 PROCEDURE — 2500000002 HC RX 250 W HCPCS SELF ADMINISTERED DRUGS (ALT 637 FOR MEDICARE OP, ALT 636 FOR OP/ED): Performed by: SURGERY

## 2024-08-10 PROCEDURE — 36415 COLL VENOUS BLD VENIPUNCTURE: CPT

## 2024-08-10 PROCEDURE — 1100000001 HC PRIVATE ROOM DAILY

## 2024-08-10 PROCEDURE — 2500000004 HC RX 250 GENERAL PHARMACY W/ HCPCS (ALT 636 FOR OP/ED): Mod: JZ | Performed by: SURGERY

## 2024-08-10 RX ADMIN — PANTOPRAZOLE SODIUM 40 MG: 40 TABLET, DELAYED RELEASE ORAL at 06:09

## 2024-08-10 RX ADMIN — METRONIDAZOLE 500 MG: 500 INJECTION, SOLUTION INTRAVENOUS at 08:32

## 2024-08-10 RX ADMIN — ACETAMINOPHEN 650 MG: 325 TABLET ORAL at 21:19

## 2024-08-10 RX ADMIN — METOPROLOL SUCCINATE 25 MG: 25 TABLET, EXTENDED RELEASE ORAL at 08:32

## 2024-08-10 RX ADMIN — ZOLPIDEM TARTRATE 5 MG: 5 TABLET, COATED ORAL at 21:17

## 2024-08-10 RX ADMIN — METRONIDAZOLE 500 MG: 500 INJECTION, SOLUTION INTRAVENOUS at 00:26

## 2024-08-10 RX ADMIN — CIPROFLOXACIN 400 MG: 400 INJECTION, SOLUTION INTRAVENOUS at 22:34

## 2024-08-10 RX ADMIN — CIPROFLOXACIN 400 MG: 400 INJECTION, SOLUTION INTRAVENOUS at 09:45

## 2024-08-10 RX ADMIN — METRONIDAZOLE 500 MG: 500 INJECTION, SOLUTION INTRAVENOUS at 16:54

## 2024-08-10 ASSESSMENT — COGNITIVE AND FUNCTIONAL STATUS - GENERAL
MOBILITY SCORE: 22
DAILY ACTIVITIY SCORE: 21
DRESSING REGULAR LOWER BODY CLOTHING: A LITTLE
DRESSING REGULAR UPPER BODY CLOTHING: A LITTLE
CLIMB 3 TO 5 STEPS WITH RAILING: A LITTLE
TOILETING: A LITTLE
CLIMB 3 TO 5 STEPS WITH RAILING: A LITTLE
TOILETING: A LITTLE
WALKING IN HOSPITAL ROOM: A LITTLE
DAILY ACTIVITIY SCORE: 21
DRESSING REGULAR LOWER BODY CLOTHING: A LITTLE
DRESSING REGULAR UPPER BODY CLOTHING: A LITTLE
MOBILITY SCORE: 23

## 2024-08-10 ASSESSMENT — PAIN - FUNCTIONAL ASSESSMENT
PAIN_FUNCTIONAL_ASSESSMENT: 0-10

## 2024-08-10 ASSESSMENT — PAIN SCALES - GENERAL
PAINLEVEL_OUTOF10: 0 - NO PAIN
PAINLEVEL_OUTOF10: 0 - NO PAIN
PAINLEVEL_OUTOF10: 3

## 2024-08-10 ASSESSMENT — PAIN DESCRIPTION - LOCATION: LOCATION: BACK

## 2024-08-10 ASSESSMENT — PAIN DESCRIPTION - ORIENTATION: ORIENTATION: MID

## 2024-08-10 NOTE — CARE PLAN
Problem: Pain - Adult  Goal: Verbalizes/displays adequate comfort level or baseline comfort level  Outcome: Progressing     Problem: Safety - Adult  Goal: Free from fall injury  Outcome: Progressing     Problem: Discharge Planning  Goal: Discharge to home or other facility with appropriate resources  Outcome: Progressing     Problem: Chronic Conditions and Co-morbidities  Goal: Patient's chronic conditions and co-morbidity symptoms are monitored and maintained or improved  Outcome: Progressing     Problem: Pain  Goal: Takes deep breaths with improved pain control throughout the shift  Outcome: Progressing  Goal: Turns in bed with improved pain control throughout the shift  Outcome: Progressing  Goal: Walks with improved pain control throughout the shift  Outcome: Progressing  Goal: Performs ADL's with improved pain control throughout shift  Outcome: Progressing  Goal: Participates in PT with improved pain control throughout the shift  Outcome: Progressing  Goal: Free from opioid side effects throughout the shift  Outcome: Progressing  Goal: Free from acute confusion related to pain meds throughout the shift  Outcome: Progressing   The patient's goals for the shift include      The clinical goals for the shift include remain comfortable during shift

## 2024-08-10 NOTE — CARE PLAN
The patient's goals for the shift include  pain management    The clinical goals for the shift include Patient safety        Problem: Pain - Adult  Goal: Verbalizes/displays adequate comfort level or baseline comfort level  Outcome: Progressing     Problem: Safety - Adult  Goal: Free from fall injury  Outcome: Progressing     Problem: Pain  Goal: Takes deep breaths with improved pain control throughout the shift  Outcome: Progressing  Goal: Turns in bed with improved pain control throughout the shift  Outcome: Progressing  Goal: Walks with improved pain control throughout the shift  Outcome: Progressing  Goal: Performs ADL's with improved pain control throughout shift  Outcome: Progressing  Goal: Participates in PT with improved pain control throughout the shift  Outcome: Progressing  Goal: Free from opioid side effects throughout the shift  Outcome: Progressing  Goal: Free from acute confusion related to pain meds throughout the shift  Outcome: Progressing

## 2024-08-10 NOTE — PROGRESS NOTES
"Dinora Lawler is a 69 y.o. female on day 3 of admission presenting with Right lower quadrant abdominal pain.    Subjective   Pain controlled, passing gas       Objective     Physical Exam  Constitutional:       Appearance: Normal appearance.   HENT:      Head: Normocephalic.   Cardiovascular:      Rate and Rhythm: Normal rate and regular rhythm.      Pulses: Normal pulses.   Pulmonary:      Effort: Pulmonary effort is normal.   Abdominal:      General: Bowel sounds are normal.      Palpations: Abdomen is soft.      Comments: SHEILA serosanguineous   Musculoskeletal:         General: Normal range of motion.   Skin:     General: Skin is warm.   Neurological:      General: No focal deficit present.      Mental Status: She is alert.   Psychiatric:         Mood and Affect: Mood normal.         Behavior: Behavior normal.         Last Recorded Vitals  Blood pressure 128/83, pulse 96, temperature 37.2 °C (98.9 °F), temperature source Oral, resp. rate 18, height 1.702 m (5' 7\"), weight 74.4 kg (164 lb), SpO2 95%.  Intake/Output last 3 Shifts:  I/O last 3 completed shifts:  In: 2000 (26.9 mL/kg) [I.V.:2000 (26.9 mL/kg)]  Out: 1840 (24.7 mL/kg) [Urine:1500 (0.6 mL/kg/hr); Drains:340]  Weight: 74.4 kg     Relevant Results                             Assessment/Plan   Principal Problem:    Right lower quadrant abdominal pain  Active Problems:    Ruptured appendicitis    Advance diet as tolerated  Likely discharge tomorrow with oral antibiotics  SHEILA prior to discharge  DVT prophylaxis in house    Pravin Multani MD      "

## 2024-08-10 NOTE — PROGRESS NOTES
"Dinora Lawler is a 69 y.o. female on day 3 of admission presenting with Right lower quadrant abdominal pain.    Subjective   Passing flatus overnight. Feeling a bit better today.        Objective     VITALS  Blood pressure 128/83, pulse 96, temperature 37.2 °C (98.9 °F), temperature source Oral, resp. rate 18, height 1.702 m (5' 7\"), weight 74.4 kg (164 lb), SpO2 95%.  Physical Exam  Vitals and nursing note reviewed.   Constitutional:       General: She is not in acute distress.     Appearance: Normal appearance.   HENT:      Head: Normocephalic and atraumatic.   Cardiovascular:      Rate and Rhythm: Normal rate and regular rhythm.      Heart sounds: Normal heart sounds.   Pulmonary:      Effort: Pulmonary effort is normal. No respiratory distress.      Breath sounds: Normal breath sounds. No wheezing.   Abdominal:      General: Abdomen is flat. Bowel sounds are decreased. There is no distension.      Palpations: Abdomen is soft.      Tenderness: There is no abdominal tenderness.      Comments: SHEILA drain with pink drainage    Genitourinary:     Comments: Land in place   Musculoskeletal:         General: No swelling or tenderness. Normal range of motion.   Skin:     General: Skin is warm and dry.      Capillary Refill: Capillary refill takes less than 2 seconds.   Neurological:      General: No focal deficit present.      Mental Status: She is alert and oriented to person, place, and time.   Psychiatric:         Mood and Affect: Mood normal.           Intake/Output last 3 Shifts:  I/O last 3 completed shifts:  In: 2000 (26.9 mL/kg) [I.V.:2000 (26.9 mL/kg)]  Out: 1840 (24.7 mL/kg) [Urine:1500 (0.6 mL/kg/hr); Drains:340]  Weight: 74.4 kg     Relevant Results  Results for orders placed or performed during the hospital encounter of 08/06/24 (from the past 24 hour(s))   CBC   Result Value Ref Range    WBC 11.2 4.4 - 11.3 x10*3/uL    nRBC 0.0 0.0 - 0.0 /100 WBCs    RBC 3.84 (L) 4.00 - 5.20 x10*6/uL    Hemoglobin 11.1 (L) " 12.0 - 16.0 g/dL    Hematocrit 38.1 36.0 - 46.0 %    MCV 99 80 - 100 fL    MCH 28.9 26.0 - 34.0 pg    MCHC 29.1 (L) 32.0 - 36.0 g/dL    RDW 13.2 11.5 - 14.5 %    Platelets 352 150 - 450 x10*3/uL   Basic Metabolic Panel   Result Value Ref Range    Glucose 87 74 - 99 mg/dL    Sodium 135 (L) 136 - 145 mmol/L    Potassium 3.6 3.5 - 5.3 mmol/L    Chloride 104 98 - 107 mmol/L    Bicarbonate 22 21 - 32 mmol/L    Anion Gap 13 10 - 20 mmol/L    Urea Nitrogen 8 6 - 23 mg/dL    Creatinine 0.47 (L) 0.50 - 1.05 mg/dL    eGFR >90 >60 mL/min/1.73m*2    Calcium 7.5 (L) 8.6 - 10.3 mg/dL       Imaging Results  XR abdomen 1 view   Final Result   1.  Ileus versus obstruction. Correlate with patient history.        MACRO:   None        Signed by: Ruddy Saha 8/8/2024 11:02 PM   Dictation workstation:   [id number]      CT abdomen pelvis w IV contrast   Final Result   Pericecal inflammatory changes most consistent with ruptured acute   appendicitis and 5 x 4 cm periappendiceal abscess.        Small amount of free fluid in the right lower quadrant and pelvis.             MACRO:   None        Signed by: Jamila Alfonso 8/6/2024 11:51 PM   Dictation workstation:   [id number]          Medications:  ciprofloxacin, 400 mg, intravenous, q12h  enoxaparin, 40 mg, subcutaneous, q24h  levothyroxine, 100 mcg, oral, Once per day on Monday Tuesday Wednesday Thursday Friday  metoprolol succinate XL, 25 mg, oral, Daily  metroNIDAZOLE, 500 mg, intravenous, q8h  pantoprazole, 40 mg, oral, Daily before breakfast   Or  pantoprazole, 40 mg, intravenous, Daily before breakfast  zolpidem, 5 mg, oral, Nightly       PRN medications: acetaminophen **OR** acetaminophen **OR** acetaminophen, fluticasone, HYDROmorphone, ondansetron **OR** ondansetron, oxyCODONE, oxyCODONE, prochlorperazine, valACYclovir        Assessment/Plan          Principal Problem:    Right lower quadrant abdominal pain  Active Problems:    Ruptured appendicitis    Ruptured acute appendicitis  with abscess, s/p lap Appy   -Cipro   -Metronidazole  -General Surgery  -Pain control  -Nausea control     Neurogenic bladder  -Remove gabriel today, continue with typical straight caths      Hypertension  -Metoprolol XL 50 mg orally daily     Hypothyroidism  -Levothyroxine 100 mcg orally daily    Post op ileus, improving   -No NG for now but if symptoms worsen will likely need one  -Out of bed and ambulating as much as possible.   -Advance diet     DVT Prophylaxis:  Lovenox subq    Disposition:  Suspect DC tomorrow if continues to improve     Jimmy Avilez, DO [facility]

## 2024-08-11 VITALS
HEART RATE: 94 BPM | HEIGHT: 67 IN | WEIGHT: 164 LBS | DIASTOLIC BLOOD PRESSURE: 93 MMHG | OXYGEN SATURATION: 94 % | BODY MASS INDEX: 25.74 KG/M2 | TEMPERATURE: 98.1 F | RESPIRATION RATE: 16 BRPM | SYSTOLIC BLOOD PRESSURE: 135 MMHG

## 2024-08-11 LAB
ANION GAP SERPL CALC-SCNC: 12 MMOL/L (ref 10–20)
BUN SERPL-MCNC: 5 MG/DL (ref 6–23)
CALCIUM SERPL-MCNC: 8.1 MG/DL (ref 8.6–10.3)
CHLORIDE SERPL-SCNC: 103 MMOL/L (ref 98–107)
CO2 SERPL-SCNC: 26 MMOL/L (ref 21–32)
CREAT SERPL-MCNC: 0.45 MG/DL (ref 0.5–1.05)
EGFRCR SERPLBLD CKD-EPI 2021: >90 ML/MIN/1.73M*2
ERYTHROCYTE [DISTWIDTH] IN BLOOD BY AUTOMATED COUNT: 13.2 % (ref 11.5–14.5)
GLUCOSE SERPL-MCNC: 93 MG/DL (ref 74–99)
HCT VFR BLD AUTO: 34.6 % (ref 36–46)
HGB BLD-MCNC: 11.4 G/DL (ref 12–16)
MCH RBC QN AUTO: 29.4 PG (ref 26–34)
MCHC RBC AUTO-ENTMCNC: 32.9 G/DL (ref 32–36)
MCV RBC AUTO: 89 FL (ref 80–100)
NRBC BLD-RTO: 0 /100 WBCS (ref 0–0)
PLATELET # BLD AUTO: 465 X10*3/UL (ref 150–450)
POTASSIUM SERPL-SCNC: 3.5 MMOL/L (ref 3.5–5.3)
RBC # BLD AUTO: 3.88 X10*6/UL (ref 4–5.2)
SODIUM SERPL-SCNC: 137 MMOL/L (ref 136–145)
WBC # BLD AUTO: 10.8 X10*3/UL (ref 4.4–11.3)

## 2024-08-11 PROCEDURE — 85027 COMPLETE CBC AUTOMATED: CPT

## 2024-08-11 PROCEDURE — 2500000001 HC RX 250 WO HCPCS SELF ADMINISTERED DRUGS (ALT 637 FOR MEDICARE OP): Performed by: SURGERY

## 2024-08-11 PROCEDURE — 82374 ASSAY BLOOD CARBON DIOXIDE: CPT

## 2024-08-11 PROCEDURE — 2500000001 HC RX 250 WO HCPCS SELF ADMINISTERED DRUGS (ALT 637 FOR MEDICARE OP): Performed by: INTERNAL MEDICINE

## 2024-08-11 PROCEDURE — 2500000004 HC RX 250 GENERAL PHARMACY W/ HCPCS (ALT 636 FOR OP/ED): Mod: JZ | Performed by: SURGERY

## 2024-08-11 PROCEDURE — 36415 COLL VENOUS BLD VENIPUNCTURE: CPT

## 2024-08-11 PROCEDURE — 99239 HOSP IP/OBS DSCHRG MGMT >30: CPT | Performed by: INTERNAL MEDICINE

## 2024-08-11 RX ORDER — OXYCODONE HYDROCHLORIDE 5 MG/1
5 TABLET ORAL EVERY 4 HOURS PRN
Qty: 15 TABLET | Refills: 0 | Status: SHIPPED | OUTPATIENT
Start: 2024-08-11

## 2024-08-11 RX ORDER — METRONIDAZOLE 500 MG/1
500 TABLET ORAL EVERY 8 HOURS SCHEDULED
Qty: 30 TABLET | Refills: 0 | Status: SHIPPED | OUTPATIENT
Start: 2024-08-11 | End: 2024-08-21

## 2024-08-11 RX ORDER — CIPROFLOXACIN 500 MG/1
500 TABLET ORAL EVERY 12 HOURS SCHEDULED
Qty: 20 TABLET | Refills: 0 | Status: SHIPPED | OUTPATIENT
Start: 2024-08-11 | End: 2024-08-21

## 2024-08-11 RX ORDER — CIPROFLOXACIN 500 MG/1
500 TABLET ORAL EVERY 12 HOURS SCHEDULED
Status: DISCONTINUED | OUTPATIENT
Start: 2024-08-11 | End: 2024-08-11 | Stop reason: HOSPADM

## 2024-08-11 RX ORDER — METRONIDAZOLE 500 MG/1
500 TABLET ORAL EVERY 8 HOURS SCHEDULED
Status: DISCONTINUED | OUTPATIENT
Start: 2024-08-11 | End: 2024-08-11 | Stop reason: HOSPADM

## 2024-08-11 RX ADMIN — METRONIDAZOLE 500 MG: 500 INJECTION, SOLUTION INTRAVENOUS at 08:29

## 2024-08-11 RX ADMIN — METRONIDAZOLE 500 MG: 500 TABLET ORAL at 13:40

## 2024-08-11 RX ADMIN — METOPROLOL SUCCINATE 25 MG: 25 TABLET, EXTENDED RELEASE ORAL at 08:29

## 2024-08-11 RX ADMIN — PANTOPRAZOLE SODIUM 40 MG: 40 TABLET, DELAYED RELEASE ORAL at 06:11

## 2024-08-11 RX ADMIN — CIPROFLOXACIN 500 MG: 500 TABLET ORAL at 12:49

## 2024-08-11 RX ADMIN — METRONIDAZOLE 500 MG: 500 INJECTION, SOLUTION INTRAVENOUS at 00:28

## 2024-08-11 ASSESSMENT — COGNITIVE AND FUNCTIONAL STATUS - GENERAL
DRESSING REGULAR LOWER BODY CLOTHING: A LITTLE
DRESSING REGULAR UPPER BODY CLOTHING: A LITTLE
CLIMB 3 TO 5 STEPS WITH RAILING: A LITTLE
MOBILITY SCORE: 23
TOILETING: A LITTLE
DAILY ACTIVITIY SCORE: 21

## 2024-08-11 ASSESSMENT — PAIN SCALES - GENERAL: PAINLEVEL_OUTOF10: 0 - NO PAIN

## 2024-08-11 ASSESSMENT — PAIN DESCRIPTION - DESCRIPTORS: DESCRIPTORS: ACHING;CRAMPING

## 2024-08-11 NOTE — PROGRESS NOTES
"Dinora Lawler is a 69 y.o. female on day 4 of admission presenting with Right lower quadrant abdominal pain.    Subjective   Feeling better today, pain well controlled, denies fever, chills, cp, SOB and n/v. Tolerating CLD. Afebrile, VSS       Objective     Physical Exam  Constitutional:       Appearance: Normal appearance.   Eyes:      Conjunctiva/sclera: Conjunctivae normal.   Cardiovascular:      Rate and Rhythm: Normal rate.   Pulmonary:      Effort: Pulmonary effort is normal.   Abdominal:      General: Abdomen is flat. There is no distension.      Palpations: Abdomen is soft.      Comments: Lap sites with Dermabond, C/D/I, edges well approximated, minor bruising without drainage or hematoma.  SHEILA drain removed without difficulty, SS in bulb. Pt tolerated well.    Musculoskeletal:         General: Normal range of motion.   Skin:     General: Skin is warm and dry.   Neurological:      Mental Status: She is oriented to person, place, and time.   Psychiatric:         Mood and Affect: Mood normal.         Behavior: Behavior normal.         Last Recorded Vitals  Blood pressure (!) 135/93, pulse 94, temperature 36.7 °C (98.1 °F), temperature source Temporal, resp. rate 16, height 1.702 m (5' 7\"), weight 74.4 kg (164 lb), SpO2 94%.  Intake/Output last 3 Shifts:  I/O last 3 completed shifts:  In: 4335 (58.3 mL/kg) [P.O.:10; I.V.:2225 (29.9 mL/kg); IV Piggyback:2100]  Out: 2805 (37.7 mL/kg) [Urine:2675 (1 mL/kg/hr); Drains:130]  Weight: 74.4 kg     Medications:   Scheduled medications  ciprofloxacin, 400 mg, intravenous, q12h  enoxaparin, 40 mg, subcutaneous, q24h  levothyroxine, 100 mcg, oral, Once per day on Monday Tuesday Wednesday Thursday Friday  metoprolol succinate XL, 25 mg, oral, Daily  metroNIDAZOLE, 500 mg, intravenous, q8h  pantoprazole, 40 mg, oral, Daily before breakfast   Or  pantoprazole, 40 mg, intravenous, Daily before breakfast  zolpidem, 5 mg, oral, Nightly      Continuous medications     PRN " medications  PRN medications: acetaminophen **OR** acetaminophen **OR** acetaminophen, fluticasone, HYDROmorphone, ondansetron **OR** ondansetron, oxyCODONE, oxyCODONE, prochlorperazine, valACYclovir    Relevant Results  Results for orders placed or performed during the hospital encounter of 08/06/24 (from the past 24 hour(s))   CBC   Result Value Ref Range    WBC 10.8 4.4 - 11.3 x10*3/uL    nRBC 0.0 0.0 - 0.0 /100 WBCs    RBC 3.88 (L) 4.00 - 5.20 x10*6/uL    Hemoglobin 11.4 (L) 12.0 - 16.0 g/dL    Hematocrit 34.6 (L) 36.0 - 46.0 %    MCV 89 80 - 100 fL    MCH 29.4 26.0 - 34.0 pg    MCHC 32.9 32.0 - 36.0 g/dL    RDW 13.2 11.5 - 14.5 %    Platelets 465 (H) 150 - 450 x10*3/uL   Basic Metabolic Panel   Result Value Ref Range    Glucose 93 74 - 99 mg/dL    Sodium 137 136 - 145 mmol/L    Potassium 3.5 3.5 - 5.3 mmol/L    Chloride 103 98 - 107 mmol/L    Bicarbonate 26 21 - 32 mmol/L    Anion Gap 12 10 - 20 mmol/L    Urea Nitrogen 5 (L) 6 - 23 mg/dL    Creatinine 0.45 (L) 0.50 - 1.05 mg/dL    eGFR >90 >60 mL/min/1.73m*2    Calcium 8.1 (L) 8.6 - 10.3 mg/dL     ECG 12 lead    Result Date: 8/9/2024  Normal sinus rhythm Possible Left atrial enlargement Borderline ECG When compared with ECG of 21-AUG-2023 09:27, Vent. rate has increased BY  34 BPM Nonspecific T wave abnormality now evident in Inferior leads See ED provider note for full interpretation and clinical correlation Confirmed by Alison Barajas ([id number]) on 8/9/2024 11:01:51 AM    XR abdomen 1 view    Result Date: 8/8/2024  Interpreted By:  Ruddy Saha, STUDY: XR ABDOMEN 1 VIEW;  8/8/2024 9:28 pm   INDICATION: Signs/Symptoms:Per radiology.   COMPARISON: 08/06/2024   ACCESSION NUMBER(S): [id number]   ORDERING CLINICIAN: EMY RUGGIERO   FINDINGS: New drain seen in the pelvis.   Numerous air-filled slightly dilated loops of small bowel are seen which may reflect a postoperative ileus in the appropriate clinical setting versus developing obstruction. There does appear to be  some residual gas in the colon.       1.  Ileus versus obstruction. Correlate with patient history.   MACRO: None   Signed by: Ruddy Deshpandela 8/8/2024 11:02 PM Dictation workstation:   [id number]    CT abdomen pelvis w IV contrast    Result Date: 8/6/2024  Interpreted By:  Jamila Alfonso, STUDY: CT ABDOMEN PELVIS W IV CONTRAST;  8/6/2024 10:00 pm   INDICATION: Signs/Symptoms:RLQ pain.   COMPARISON: None.   ACCESSION NUMBER(S): [id number]   ORDERING CLINICIAN: RUSTY MAYO   TECHNIQUE: Axial CT images of the abdomen and pelvis with coronal and sagittal reconstructed images obtained after intravenous administration of contrast   FINDINGS: LOWER CHEST: No acute abnormality of the lung bases. BONES: No acute osseous abnormality. ABDOMINAL WALL: Within normal limits.   ABDOMEN:   LIVER: Hypodensity adjacent to the falciform ligament, likely focal fatty infiltration. BILE DUCTS: No biliary dilatation. GALLBLADDER: No calcified gallstones. No pericholecystic inflammatory changes. PANCREAS: Within normal limits. SPLEEN: Within normal limits. ADRENALS: Within normal limits. KIDNEYS and URETERS: Symmetric renal enhancement. No hydronephrosis or perinephric fluid collection. Cortical hypodense lesions measuring up to 14 mm in the left kidney are most likely simple cysts.     VESSELS: No aortic aneurysm. RETROPERITONEUM: No pathologically enlarged retroperitoneal lymph nodes.   PELVIS:   REPRODUCTIVE ORGANS: No pelvic masses. BLADDER: Within normal limits.   BOWEL: The stomach is not distended. No dilated small bowel.  The appendix is enlarged and the mucosa is distinct. There is the fluid collection surrounding the appendix measuring 5 x 4 cm in transverse dimension, with surrounding fat stranding. Reactive wall thickening of the cecum and terminal ileum is noted. PERITONEUM: Small amount of free pelvic fluid. No pneumoperitoneum.       Pericecal inflammatory changes most consistent with ruptured acute appendicitis and 5  x 4 cm periappendiceal abscess.   Small amount of free fluid in the right lower quadrant and pelvis.     MACRO: None   Signed by: Jamila Alfonso 8/6/2024 11:51 PM Dictation workstation:   [id number]    MR cardiac angio chest w and wo IV contrast for morph FUNCT valve DZ and GREAT vessels    Result Date: 8/6/2024  Interpreted By:  Leoncio Staley, STUDY: MR CARDIAC ANGIO CHEST W AND WO IV CONTRAST FOR MORPH FUNCT VALVE DZ AND GREAT VESSELS;  8/6/2024 8:19 am   INDICATION: Signs/Symptoms:monitor TAA 4.5 cm.   This study is performed to assess myocardial viability and damage, and to quantitate left ventricular and valvular function.   COMPARISON: CT dated 08/04/2022   ACCESSION NUMBER(S): [id number]   ORDERING CLINICIAN: SONDRA GARZON   TECHNIQUE: Siemens1.5  Ariadne MRI scanner. Turbo spin echo and balanced steady state free precession (bSSFP) imaging for anatomic definition. Dynamic cine bSSFP for cardiac chamber and wall-motion analysis, and valvular analysis. Flow quantification sequences for hemodynamics. Delayed gadolinium enhancement analysis after injection of gadolinium-chelate (30 mL Dotarem, 0.2 mmol/kg).   FINDINGS: CARDIAC CHAMBERS Normal atrioventricular and ventriculoarterial concordance   LEFT ATRIUM Normal size   RIGHT ATRIUM Normal size   INTERATRIAL SEPTUM Intact.   LEFT VENTRICLE The left ventricle is normal in size and shape, and has normal global systolic function. There are no segmental wall motion abnormalities. Quantitative left ventricular functional values are as follows: EDV = 129 cc; EDVi = 68 cc/m2 ESV = 40 cc; ESVi = 21 cc/m2 Stroke volume = 89 cc; SVi = 47 cc/m2 LVEF = 69 % Absolute Cardiac Output = 8.19 l/min.; COi = 4.33 l/min/m2 LV mass = 108 gm; LVMi = 57 gm/m2   *Jed AM et al. Normalized left ventricular systolic and diastolic function by steady state free precession cardiovascular magnetic resonance. J Cardiovasc Magn Reson 2006; 8:417-26.   Delayed-enhancement imaging reveals  "uniformly \"nulled\" myocardium, signifying that there has been no prior ischemic myocardial damage. There is also no definite evidence of interstitial fibrosis to suggest an infiltrative process. Myocardial T1 and T2 mapping times are normal.   RIGHT VENTRICLE The right ventricle appears normal in size, shape, and has normal qualitative systolic function. No segmental wall motion abnormalities. No abnormal delayed enhancement in the myocardium.   INTERVENTRICULAR SEPTUM Intact.   AORTIC VALVE There is  trace aortic regurgitation. Flow quantification through the ascending aorta: Forward volume =78 cc/beat Reverse volume = 5 cc/beat Net forward volume = 73 cc/beat Aortic regurgitant fraction = 7 %   MITRAL VALVE There is  no mitral regurgitation.   TRICUSPID VALVE There is qualitative no tricuspid regurgitation.   THORACIC AORTA The thoracic aorta appears normal in course, caliber, and contour. There is no evidence for acute aortic pathology. The arch vessel branching pattern is normal. All the arch branch vessels appear widely patent in their proximal portions. The visualized upper abdominal aorta is within normal limits.   REPRESENTATIVE MEASUREMENTS OF THE AORTA: Annulus 3.1 x 2.3 cm Root (Sinus of Valsalva) 3.4 cm (cusp to commissure) Sinotubular junction 3.4 cm Mid ascending 4.5 cm Distal ascending 3.9 cm Mid transverse arch 2.9 cm Isthmus 2.6 cm Mid descending 2.1 cm Distal descending (hiatus) 2.2 cm   PULMONARY ARTERIES The central pulmonary arteries appear  normal.   SYSTEMIC AND PULMONARY VEINS Normal systemic venous and pulmonary venous return. The SVC and IVC are of normal caliber. Normal pulmonary venous anatomy.   CHEST The chest wall is normal. No significant lymphadenopathy or mass is seen in limited images of the mediastinum. Limited imaging through the lungs reveals no gross abnormalities. No pleural effusion.   UPPER ABDOMEN There is a nonenhancing cyst in the left kidney.       1.  The left ventricle " "is normal in size, shape, and has normal global systolic function. LVEF = 69%. There are no segmental wall motion abnormalities.  Quantitative values are as noted above. 2.  There are no findings to suggest prior ischemic damage or an infiltrative process. 3.  Trace aortic valve regurgitation (regurgitant fraction = 7%). 4. Aneurysm of the ascending aorta measuring up to 4.5 cm which is stable from prior exam. Remaining aorta is normal in course and caliber. Recommend continued imaging surveillance as per clinical guidelines.     MACRO: None   Signed by: Leoncio Staley 8/6/2024 9:48 AM Dictation workstation:   [id number]         Assessment/Plan   Principal Problem:    Right lower quadrant abdominal pain  Active Problems:    Ruptured appendicitis    Dinora Lawler is a 70 yo female who presented with RLQ abdominal pain and was found to have acute appendicitis. She is now POD #4 s/p lx appendectomy with Dr Multani. Intra-operative findings showed \" perforated appendicitis.\" Abdomen is soft, appropriately tender, Lap sites with Dermabond, C/D/I, edges well approximated, minor bruising without drainage or hematoma. SHEILA drain is SS, removed without difficulty.     Plan:   POD #4 lx appy  - advance to regular diet  - SHEILA removed today  - oob and walking as much as tolerated  - IS 10x/hr  - DVT ppx: lovenox/SCDs  - ok to transition oral abx to PO  - supportive care per primary     Dispo: ok to be discharged when medically clear. Follow up with Dr Multani for post-op check in 2 weeks.        I spent 35 minutes in the professional and overall care of this patient.      ALANNAH Thomas-CNP      "

## 2024-08-11 NOTE — CARE PLAN
The patient's goals for the shift include      The clinical goals for the shift include Patient safety    Over the shift, the patient did not make progress toward the following goals. Barriers to progression include . Recommendations to address these barriers include .

## 2024-08-12 ENCOUNTER — PATIENT OUTREACH (OUTPATIENT)
Dept: PRIMARY CARE | Facility: CLINIC | Age: 69
End: 2024-08-12
Payer: COMMERCIAL

## 2024-08-12 NOTE — DISCHARGE SUMMARY
Discharge Diagnosis  Right lower quadrant abdominal pain    Issues Requiring Follow-Up  General Surgery follow-up  PCP follow-up    Discharge Meds     Your medication list        START taking these medications        Instructions Last Dose Given Next Dose Due   ciprofloxacin 500 mg tablet  Commonly known as: Cipro      Take 1 tablet (500 mg) by mouth every 12 hours for 20 doses.       metroNIDAZOLE 500 mg tablet  Commonly known as: Flagyl      Take 1 tablet (500 mg) by mouth every 8 hours for 30 doses.       oxyCODONE 5 mg immediate release tablet  Commonly known as: Roxicodone      Take 1 tablet (5 mg) by mouth every 4 hours if needed for severe pain (7 - 10).              CONTINUE taking these medications        Instructions Last Dose Given Next Dose Due   ergocalciferol 1.25 MG (06725 UT) capsule  Commonly known as: Vitamin D-2      TAKE 1 CAPSULE BY MOUTH ONE TIME PER WEEK       eszopiclone 2 mg tablet  Commonly known as: Lunesta      TAKE 1 TABLET (2 MG) BY MOUTH AS NEEDED AT BEDTIME FOR SLEEP. TAKE IMMEDIATELY BEFORE BEDTIME       fluticasone 50 mcg/actuation nasal spray  Commonly known as: Flonase      SPRAY 2 SPRAYS INTO EACH NOSTRIL EVERY DAY       levothyroxine 100 mcg tablet  Commonly known as: Synthroid, Levoxyl      TAKE 1 TABLET BY MOUTH ONCE DAILY.       metoprolol succinate XL 25 mg 24 hr tablet  Commonly known as: Toprol-XL      TAKE 1 TABLET BY MOUTH EVERY DAY       predniSONE 1 mg tablet  Commonly known as: Deltasone      Take 3 tablets (3 mg) by mouth once daily.       valACYclovir 500 mg tablet  Commonly known as: Valtrex                  STOP taking these medications      loperamide 2 mg capsule  Commonly known as: Imodium        LORazepam 1 mg tablet  Commonly known as: Ativan        zolpidem 10 mg tablet  Commonly known as: Ambien                  Where to Get Your Medications        These medications were sent to [facility]/pharmacy #[id number] - [facility] [id number] [facility]  [address] [place] [address]  "[place], [facility] [id number]      Phone: [phone]   ciprofloxacin 500 mg tablet  metroNIDAZOLE 500 mg tablet  oxyCODONE 5 mg immediate release tablet         Test Results Pending At Discharge  Pending Labs       Order Current Status    Surgical Pathology Exam In process            Procedures  Procedure(s):  Appendectomy Laparoscopy     Hospital Course   Dinora was admitted to hospital with perforated bedside.  She was evaluated by the general surgery team and taken to the OR for appendectomy.  She did have a SHEILA drain left in place.  Following surgery she did have a postop ileus which did slow down her discharge.  She did slowly improve.  They are able to take out her SHEILA drain prior to discharge.  She will need to finish a course of oral antibiotics and follow-up with her primary care physician as well as the surgery team.  At this time she is otherwise hemodynamically stable appropriate for discharge.  This plan was discussed with her and she is in agreement.  All questions were answered.    Blood pressure (!) 135/93, pulse 94, temperature 36.7 °C (98.1 °F), temperature source Temporal, resp. rate 16, height 1.702 m (5' 7\"), weight 74.4 kg (164 lb), SpO2 94%.  Pertinent Physical Exam At Time of Discharge  Physical Exam  Vitals and nursing note reviewed.   Constitutional:       General: She is not in acute distress.     Appearance: Normal appearance.   HENT:      Head: Normocephalic and atraumatic.   Cardiovascular:      Rate and Rhythm: Normal rate and regular rhythm.      Heart sounds: Normal heart sounds.   Pulmonary:      Effort: Pulmonary effort is normal. No respiratory distress.      Breath sounds: Normal breath sounds. No wheezing.   Abdominal:      General: Abdomen is flat. Bowel sounds are decreased. There is no distension.      Palpations: Abdomen is soft.      Tenderness: There is no abdominal tenderness.   Musculoskeletal:         General: No swelling or tenderness. Normal range of motion.   Skin:     " General: Skin is warm and dry.      Capillary Refill: Capillary refill takes less than 2 seconds.   Neurological:      General: No focal deficit present.      Mental Status: She is alert and oriented to person, place, and time.   Psychiatric:         Mood and Affect: Mood normal.         Outpatient Follow-Up  Future Appointments   Date Time Provider Department Center   8/20/2024  8:00 AM Gaby Bernstein MD [id number] [facility]   8/29/2024  8:00 AM Neeraj Ward MD [id number] [facility]         Jimmy Avilez DO FACP     Time spent during this discharge: >30 min

## 2024-08-12 NOTE — PROGRESS NOTES
Discharge Facility: [facility]   Discharge Diagnosis: Right lower quadrant abdominal pain; Ruptured appendicitis   Admission Date:  8/7/2024   Discharge Date: 8/11/2024     PCP Appointment Date: 8/20/2024  Specialist Appointment Date:   Follow up with Dr Multani for post-op check in 2 weeks.  Hospital Encounter and Summary Linked: Yes  See discharge assessment below for further details  Engagement  Call Start Time: 1231 (8/12/2024 12:36 PM)    Medications  Medications reviewed with patient/caregiver?: Yes (new meds discussed with patient) (8/12/2024 12:36 PM)  Is the patient having any side effects they believe may be caused by any medication additions or changes?: No (8/12/2024 12:36 PM)  Does the patient have all medications ordered at discharge?: Yes (8/12/2024 12:36 PM)  Care Management Interventions: No intervention needed (8/12/2024 12:36 PM)  Prescription Comments: see med list (cipro; flagyl; oxycodone) (8/12/2024 12:36 PM)  Is the patient taking all medications as directed (includes completed medication regime)?: Yes (8/12/2024 12:36 PM)  Care Management Interventions: Provided patient education (8/12/2024 12:36 PM)    Appointments  Does the patient have a primary care provider?: Yes (8/12/2024 12:36 PM)  Care Management Interventions: Verified appointment date/time/provider; Educated patient on importance of making appointment (8/12/2024 12:36 PM)  Has the patient kept scheduled appointments due by today?: Yes (8/12/2024 12:36 PM)  Care Management Interventions: Advised patient to keep appointment (8/12/2024 12:36 PM)    Self Management  What is the home health agency?: denies need (8/12/2024 12:36 PM)  What Durable Medical Equipment (DME) was ordered?: n/a (8/12/2024 12:36 PM)    Patient Teaching  Does the patient have access to their discharge instructions?: Yes (8/12/2024 12:36 PM)  Care Management Interventions: Reviewed instructions with patient (8/12/2024 12:36 PM)  What is the patient's  "perception of their health status since discharge?: Improving (8/12/2024 12:36 PM)  Is the patient/caregiver able to teach back the hierarchy of who to call/visit for symptoms/problems? PCP, Specialist, Home Health nurse, Urgent Care, ED, 911: Yes (8/12/2024 12:36 PM)  Patient/Caregiver Education Comments: Successful transition of care outreach with patient. Patient reports doing well at home since discharge. New meds/changes reviewed with patient during outreach. Patient states she is having \"very little pain\" and did not get the oxycodone filled as she did not need it. Patient denies further discharge questions/concerns/needs at time of outreach call. Emphasized that follow up appts are needed after discharge with PCP and reviewed needed follow ups with any specialties to assess response to treatment from hospitalization. Patient aware of my availability for non-emergent concerns. Patient states she does not need further follow up calls as she used to work for  as a physician for 13 years. (8/12/2024 12:36 PM)        "

## 2024-08-16 LAB
LABORATORY COMMENT REPORT: NORMAL
PATH REPORT.FINAL DX SPEC: NORMAL
PATH REPORT.GROSS SPEC: NORMAL
PATH REPORT.RELEVANT HX SPEC: NORMAL
PATH REPORT.TOTAL CANCER: NORMAL

## 2024-08-16 NOTE — DOCUMENTATION CLARIFICATION NOTE
"    PATIENT:               LARRY HESS  ACCT #:                  [id number]  MRN:                       [id number]  :                       1955  ADMIT DATE:       2024 9:09 PM  DISCH DATE:        2024 2:54 PM  RESPONDING PROVIDER #:        [id number]          PROVIDER RESPONSE TEXT:    Quadriplegia is ruled out    CDI QUERY TEXT:    Clarification        Instruction:    Based on your assessment of the patient and the clinical information, please provide the requested documentation by clicking on the appropriate radio button and enter any additional information if prompted.    Question: Based on the information, please further clarify the diagnosis of Quadriplegia noted as a past medical history    When answering this query, please exercise your independent professional judgment. The fact that a question is being asked, does not imply that any particular answer is desired or expected.    The patient's clinical indicators include:  Clinical Information: Patient presented with right lower quadrant abdominal pain, found to have ruptured appendicitis that was removed on 24    Documented Diagnosis:  \"Quadriplegia\"    Clinical Indicators:  -History and physical -past medical history section: \"Quadriplegia\"    -Flowsheets-ADL screening: Patient is independent in: dressing, grooming, feeding, bathing, toileting. Needs assistance with: in/out of bed, walks in home. Weakness of legs/arms/hands-none    Treatment: PT    Risk Factors: unknown  Options provided:  -- Quadriplegia is ruled out  -- Quadriplegia ruled in as evidenced by, Please specify additional information below  -- Other - I will add my own diagnosis  -- Refer to Clinical Documentation Reviewer    Query created by: Masha Alejo on 2024 1:15 PM      Electronically signed by:  JET DOUGLASS DO 2024 7:26 AM          "

## 2024-08-19 ENCOUNTER — OFFICE VISIT (OUTPATIENT)
Dept: SURGERY | Facility: CLINIC | Age: 69
End: 2024-08-19
Payer: COMMERCIAL

## 2024-08-19 VITALS
TEMPERATURE: 96.6 F | SYSTOLIC BLOOD PRESSURE: 113 MMHG | HEIGHT: 67 IN | WEIGHT: 160 LBS | DIASTOLIC BLOOD PRESSURE: 71 MMHG | BODY MASS INDEX: 25.11 KG/M2 | HEART RATE: 70 BPM

## 2024-08-19 DIAGNOSIS — B37.31 VAGINAL YEAST INFECTION: Primary | ICD-10-CM

## 2024-08-19 DIAGNOSIS — K35.32 RUPTURED APPENDICITIS: ICD-10-CM

## 2024-08-19 PROCEDURE — 1126F AMNT PAIN NOTED NONE PRSNT: CPT | Performed by: NURSE PRACTITIONER

## 2024-08-19 PROCEDURE — 1036F TOBACCO NON-USER: CPT | Performed by: NURSE PRACTITIONER

## 2024-08-19 PROCEDURE — 1159F MED LIST DOCD IN RCRD: CPT | Performed by: NURSE PRACTITIONER

## 2024-08-19 PROCEDURE — 99211 OFF/OP EST MAY X REQ PHY/QHP: CPT | Performed by: NURSE PRACTITIONER

## 2024-08-19 PROCEDURE — 1160F RVW MEDS BY RX/DR IN RCRD: CPT | Performed by: NURSE PRACTITIONER

## 2024-08-19 PROCEDURE — 1111F DSCHRG MED/CURRENT MED MERGE: CPT | Performed by: NURSE PRACTITIONER

## 2024-08-19 RX ORDER — FLUCONAZOLE 150 MG/1
150 TABLET ORAL ONCE
Qty: 1 TABLET | Refills: 0 | Status: SHIPPED | OUTPATIENT
Start: 2024-08-19 | End: 2024-08-19

## 2024-08-19 ASSESSMENT — PAIN SCALES - GENERAL: PAINLEVEL: 0-NO PAIN

## 2024-08-19 NOTE — PROGRESS NOTES
"Chief Compliant:  POV      History Of Present Illness  Dinora Lawler is a 69 y.o. female recent history of ruptured appendix. She is s/p appendectomy on 8/7/24.       Component  Resulting Agency   FINAL DIAGNOSIS   A. APPENDIX:    -- Acute appendicitis and periappendicitis  -- Serrated polyp of appendix, negative for dysplasia     Tires easily  Denies any F/C, N/V, CP/SOB, dysuria.   Appetite: Improving  Energy: improving slowly. Increasing activity daily.  Weight: Down 7 lbs  BM: daily. No diarrhea or constipation.  Some vaginal itching d/t antibiotics. She still has a few days left.    Physical Exam  Constitutional: Well developed, awake/alert/oriented x3, no distress, alert and cooperative   Gastrointestinal: Nondistended, soft, non-tender. Incision are clean and dry.   Extremities: normal extremities, no edema  Neurological: alert and oriented x3, normal strength, Normal gait   Psychological: Appropriate mood and behavior   Skin: Warm and dry, no lesions, no rashes     Last Recorded Vitals  /71   Pulse 70   Temp 35.9 °C (96.6 °F)   Ht 1.702 m (5' 7\")   Wt 72.6 kg (160 lb)   BMI 25.06 kg/m²     Assessment:  H/o ruptured appendix  S/p appendectomy 8/7/24. Still on antibiotics  Vaginal itching.   Doing better    Plan:  -Diet: OK to slowly advance diet.   -Activity: Normal activities can be resumed. No lifting greater than 10 lbs for a full 6 weeks following surgery   -Diflucan 150 mg x 1   -Ok to resume Metamucil daily  -Follow-up with Dr. Rangel GUTIERREZ  -All questions and concerns were answered. Encouraged to call with any question or concerns.      Denisa Scales, APRN-CNP    "

## 2024-08-20 ENCOUNTER — APPOINTMENT (OUTPATIENT)
Dept: PRIMARY CARE | Facility: CLINIC | Age: 69
End: 2024-08-20
Payer: COMMERCIAL

## 2024-08-20 VITALS
TEMPERATURE: 97.6 F | SYSTOLIC BLOOD PRESSURE: 111 MMHG | HEIGHT: 67 IN | HEART RATE: 82 BPM | BODY MASS INDEX: 25.15 KG/M2 | DIASTOLIC BLOOD PRESSURE: 79 MMHG | WEIGHT: 160.2 LBS

## 2024-08-20 DIAGNOSIS — G47.00 INSOMNIA, UNSPECIFIED TYPE: ICD-10-CM

## 2024-08-20 DIAGNOSIS — K35.32 RUPTURED APPENDICITIS: ICD-10-CM

## 2024-08-20 DIAGNOSIS — Z00.00 ANNUAL PHYSICAL EXAM: Primary | ICD-10-CM

## 2024-08-20 DIAGNOSIS — E78.49 OTHER HYPERLIPIDEMIA: ICD-10-CM

## 2024-08-20 DIAGNOSIS — Z12.39 BREAST SCREENING: ICD-10-CM

## 2024-08-20 DIAGNOSIS — E03.8 OTHER SPECIFIED HYPOTHYROIDISM: ICD-10-CM

## 2024-08-20 DIAGNOSIS — M35.3 POLYMYALGIA RHEUMATICA (MULTI): ICD-10-CM

## 2024-08-20 PROBLEM — G82.50 QUADRIPLEGIA (MULTI): Status: ACTIVE | Noted: 2024-08-20

## 2024-08-20 PROCEDURE — 99397 PER PM REEVAL EST PAT 65+ YR: CPT | Performed by: INTERNAL MEDICINE

## 2024-08-20 PROCEDURE — 1111F DSCHRG MED/CURRENT MED MERGE: CPT | Performed by: INTERNAL MEDICINE

## 2024-08-20 PROCEDURE — 99495 TRANSJ CARE MGMT MOD F2F 14D: CPT | Performed by: INTERNAL MEDICINE

## 2024-08-20 PROCEDURE — 3008F BODY MASS INDEX DOCD: CPT | Performed by: INTERNAL MEDICINE

## 2024-08-20 RX ORDER — ZOLPIDEM TARTRATE 5 MG/1
5 TABLET ORAL NIGHTLY PRN
Qty: 90 TABLET | Refills: 0 | Status: SHIPPED | OUTPATIENT
Start: 2024-08-20 | End: 2025-04-17

## 2024-08-20 RX ORDER — ESZOPICLONE 3 MG/1
3 TABLET, FILM COATED ORAL NIGHTLY PRN
Qty: 90 TABLET | Refills: 0 | Status: CANCELLED | OUTPATIENT
Start: 2024-08-20 | End: 2024-09-19

## 2024-08-20 ASSESSMENT — ENCOUNTER SYMPTOMS
APPETITE CHANGE: 0
NECK PAIN: 0
NERVOUS/ANXIOUS: 0
ABDOMINAL PAIN: 0
HEADACHES: 0
UNEXPECTED WEIGHT CHANGE: 0
SLEEP DISTURBANCE: 0
FATIGUE: 0
DIFFICULTY URINATING: 0
FREQUENCY: 0
DYSURIA: 0
CHEST TIGHTNESS: 0
BACK PAIN: 0
LIGHT-HEADEDNESS: 0
ARTHRALGIAS: 0
COUGH: 0
DIZZINESS: 0
WEAKNESS: 0
SHORTNESS OF BREATH: 0
PALPITATIONS: 0
FEVER: 0
FLANK PAIN: 0
WHEEZING: 0
ROS GI COMMENTS: FULLNESS
BLOOD IN STOOL: 0
DECREASED CONCENTRATION: 0
SORE THROAT: 0

## 2024-08-20 NOTE — PROGRESS NOTES
Subjective   Patient ID: Dinora Lawler is a 69 y.o. female who presents for Annual Exam (Pt present today for physical. ls).    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    She was admitted to the hospital on 8/6/2024 for severe right lower quadrant fullness and pain. CT A/P showed ruptured acute appendicitis and very open distal abscess. General surgery was consulted and underwent laparoscopic appendectomy, SHEILA drain placed and eventually removed.  Today she complains of fullness in her abdomen with loose stools. Denies any sharp pain. No fever or chills. She is still experiencing tiredness.    Medical history is significant for hypothyroidism, hyperlipidemia, mild ascending aorta validation, polymyalgia rheumatica, on chronic prednisone, currently discontinued, chronic insomnia.   She is currently on Lunesta which does not help with her sleep. She is taking Ambien in the past which has helped with insomnia.    Mammogram-2023  colonoscopy-2020    Hospital discharge TCM  date of admission-8/6/2024  date of discharge-8/11/2024  diagnosis ruptured appendix with abscess status post laparoscopic appendectomy      Review of Systems   Constitutional:  Negative for appetite change, fatigue, fever and unexpected weight change.   HENT:  Negative for congestion, postnasal drip and sore throat.    Eyes:  Negative for visual disturbance.   Respiratory:  Negative for cough, chest tightness, shortness of breath and wheezing.    Cardiovascular:  Negative for chest pain, palpitations and leg swelling.   Gastrointestinal:  Negative for abdominal pain and blood in stool.        Fullness   Genitourinary:  Negative for difficulty urinating, dysuria, flank pain and frequency.   Musculoskeletal:  Negative for arthralgias, back pain, gait problem and neck pain.   Skin:  Negative for rash.   Neurological:  Negative for dizziness, weakness, light-headedness and headaches.  "  Psychiatric/Behavioral:  Negative for decreased concentration and sleep disturbance. The patient is not nervous/anxious.        Objective   /79   Pulse 82   Temp 36.4 °C (97.6 °F)   Ht 1.702 m (5' 7\")   Wt 72.7 kg (160 lb 3.2 oz)   BMI 25.09 kg/m²     Physical Exam  Vitals reviewed.   Constitutional:       General: She is not in acute distress.     Appearance: Normal appearance.   HENT:      Head: Normocephalic and atraumatic.      Right Ear: Tympanic membrane and ear canal normal.      Left Ear: Tympanic membrane and ear canal normal.      Mouth/Throat:      Mouth: Mucous membranes are moist.   Eyes:      Extraocular Movements: Extraocular movements intact.      Conjunctiva/sclera: Conjunctivae normal.      Pupils: Pupils are equal, round, and reactive to light.   Cardiovascular:      Rate and Rhythm: Normal rate and regular rhythm.      Pulses: Normal pulses.   Pulmonary:      Effort: Pulmonary effort is normal. No respiratory distress.      Breath sounds: Normal breath sounds.   Abdominal:      General: Bowel sounds are normal. There is no distension.      Palpations: Abdomen is soft.      Tenderness: There is abdominal tenderness. There is no guarding.      Comments: Laparoscopic surgical scars healing well  hyperactive bowel sounds, mild tenderness, no guarding   Musculoskeletal:         General: No swelling or tenderness. Normal range of motion.      Cervical back: Normal range of motion.   Skin:     General: Skin is warm.   Neurological:      General: No focal deficit present.      Mental Status: She is alert.      Coordination: Coordination normal.      Gait: Gait normal.   Psychiatric:         Mood and Affect: Mood normal.         Behavior: Behavior normal.         Assessment/Plan   Diagnoses and all orders for this visit:  Annual physical exam  Comments:  Physical exam completed  Orders:  -     Hemoglobin A1c; Future  -     Albumin-Creatinine Ratio, Urine Random; Future  Insomnia, unspecified " type  Comments:  Discontinue Lunesta as it is not helping with her symptoms  restart Ambien-had a detailed discussion, OARRS report reviewed  Orders:  -     Referral to Adult Sleep Medicine; Future  -     zolpidem (Ambien) 5 mg tablet; Take 1 tablet (5 mg) by mouth as needed at bedtime for sleep.  Other hyperlipidemia  Polymyalgia rheumatica (Multi)  Comments:  Symptoms are stable   currently off of prednisone  Other specified hypothyroidism  Comments:  Continue levothyroxine  Breast screening  Comments:  Screening mammogram ordered  Orders:  -     BI mammo bilateral screening tomosynthesis; Future  Ruptured appendicitis  Comments:  Recent hospitalization for ruptured appendicitis, with abscess-status post appendectomy  continue antibiotics as prescribed  follow-up with surgery    Follow-up in six months

## 2024-08-21 ENCOUNTER — PATIENT MESSAGE (OUTPATIENT)
Dept: SURGERY | Facility: CLINIC | Age: 69
End: 2024-08-21
Payer: COMMERCIAL

## 2024-08-26 ENCOUNTER — APPOINTMENT (OUTPATIENT)
Dept: SURGERY | Facility: CLINIC | Age: 69
End: 2024-08-26
Payer: COMMERCIAL

## 2024-08-29 ENCOUNTER — OFFICE VISIT (OUTPATIENT)
Dept: CARDIOLOGY | Facility: CLINIC | Age: 69
End: 2024-08-29
Payer: COMMERCIAL

## 2024-08-29 VITALS
WEIGHT: 160 LBS | BODY MASS INDEX: 24.25 KG/M2 | HEART RATE: 74 BPM | HEIGHT: 68 IN | SYSTOLIC BLOOD PRESSURE: 102 MMHG | OXYGEN SATURATION: 93 % | DIASTOLIC BLOOD PRESSURE: 70 MMHG

## 2024-08-29 DIAGNOSIS — I77.810 MILD ASCENDING AORTA DILATATION (CMS-HCC): Primary | ICD-10-CM

## 2024-08-29 DIAGNOSIS — E78.00 PURE HYPERCHOLESTEROLEMIA: ICD-10-CM

## 2024-08-29 PROCEDURE — 99214 OFFICE O/P EST MOD 30 MIN: CPT | Performed by: INTERNAL MEDICINE

## 2024-08-29 PROCEDURE — 1111F DSCHRG MED/CURRENT MED MERGE: CPT | Performed by: INTERNAL MEDICINE

## 2024-08-29 PROCEDURE — 1126F AMNT PAIN NOTED NONE PRSNT: CPT | Performed by: INTERNAL MEDICINE

## 2024-08-29 PROCEDURE — 1036F TOBACCO NON-USER: CPT | Performed by: INTERNAL MEDICINE

## 2024-08-29 PROCEDURE — 3008F BODY MASS INDEX DOCD: CPT | Performed by: INTERNAL MEDICINE

## 2024-08-29 PROCEDURE — 1159F MED LIST DOCD IN RCRD: CPT | Performed by: INTERNAL MEDICINE

## 2024-08-29 ASSESSMENT — ENCOUNTER SYMPTOMS
LOSS OF SENSATION IN FEET: 0
OCCASIONAL FEELINGS OF UNSTEADINESS: 0
DEPRESSION: 0

## 2024-08-29 ASSESSMENT — PAIN SCALES - GENERAL: PAINLEVEL: 0-NO PAIN

## 2024-08-29 NOTE — PROGRESS NOTES
Primary Care Physician: Gaby Bernstein MD  Date of Visit: 08/29/2024  8:00 AM EDT  [place] [facility] [place] [address]   Last office visit: August 21, 2023    Chief Complaint:     Follow-up thoracic aortic aneurysm    HPI/Summary  Dinora Lawler is a 69 y.o. female who presents for followup cardiology evaluation.     The patient presents with symptomatic PAC, and documented thoracic aortic aneurysm.  In 2015, the aneurysm measured 4.3 x 4.4 cm.  In 2022, there was a mild interval increase to 4.6 cm.  CT angiography on August 17, 2023 showed that the thoracic aorta was aneurysmal, with a maximum mid ascending diameter of 4.5 cm.  The sinotubular junction was preserved.  No other pathology noted.  We obtained a cardiac MRI on August 6, 2024 prior to today's office visit.  There were no segmental wall motion abnormalities, the ejection fraction was preserved, right ventricular systolic function was normal, there was trace AI, no MR, in the mid ascending aorta measured 4.5 cm.  Stable from prior examination.  The patient was hospitalized on August 6, 2024 with ruptured appendicitis, underwent laparoscopic appendectomy.  A CT scan performed during the hospitalization did not show any aneurysm of the abdominal aorta.    She continues on metoprolol succinate 25 mg daily and a variety of other medications were reconciled today.    She experiences rare palpitations.  She walks out of doors for exercise, up to 4 miles daily.  In the colder weather, she walks on a treadmill at 3.0 to 3.5 mph.  No chest pain or exertional dyspnea.    Specialty Problems          Cardiology Problems    Hyperlipidemia    Mild ascending aorta dilatation (CMS-HCC)     Stable based on symptoms and exam. Continue established treatment plan and follow up at least yearly.          Palpitations    PVC (premature ventricular contraction)        Past Medical History:   Diagnosis Date    Encounter for immunization 05/28/2020    Need for prophylactic  "vaccination and inoculation against influenza    Fever, unspecified 04/13/2015    Fever with chills    Other conditions influencing health status     Spinal Hematoma    Other general symptoms and signs 07/09/2013    Nonspecific abnormal finding    Quadriplegia, unspecified (Multi)     Quadriplegia    Unspecified symptoms and signs involving the genitourinary system 02/25/2016    UTI symptoms          Past Surgical History:   Procedure Laterality Date    BREAST BIOPSY      CERVICAL LAMINECTOMY  05/18/2015    Laminectomy Cervical    OTHER SURGICAL HISTORY  09/30/2016    Hysteroscopy With Resection For Intrauterine Polyp Removal            Social History     Tobacco Use    Smoking status: Never    Smokeless tobacco: Never   Substance Use Topics    Alcohol use: Yes     Alcohol/week: 2.0 standard drinks of alcohol     Types: 2 Glasses of wine per week    Drug use: Never                 Allergies   Allergen Reactions    Adhesive Tape-Silicones Unknown    Amoxicillin-Pot Clavulanate Itching         Current Outpatient Medications   Medication Instructions    ergocalciferol (VITAMIN D-2) 1,250 mcg, oral, Once Weekly    eszopiclone (LUNESTA) 2 mg, oral, Nightly PRN, Take immediately before bedtime    fluticasone (Flonase) 50 mcg/actuation nasal spray SPRAY 2 SPRAYS INTO EACH NOSTRIL EVERY DAY    levothyroxine (SYNTHROID, LEVOXYL) 100 mcg, oral, Daily    metoprolol succinate XL (TOPROL-XL) 25 mg, oral, Daily    predniSONE (DELTASONE) 3 mg, oral, Daily    valACYclovir (VALTREX) 500 mg, oral, 2 times daily PRN    zolpidem (AMBIEN) 5 mg, oral, Nightly PRN       ROS     Vital Signs:  Vitals:    08/29/24 0816   BP: 102/70   BP Location: Left arm   Patient Position: Sitting   BP Cuff Size: Adult   Pulse: 74   SpO2: 93%   Weight: 72.6 kg (160 lb)   Height: 1.715 m (5' 7.5\")     Wt Readings from Last 2 Encounters:   08/29/24 72.6 kg (160 lb)   08/20/24 72.7 kg (160 lb 3.2 oz)     Body mass index is 24.69 kg/m².       Physical " "Exam:    She appeared well.  The lungs were clear.  The heart sounds were regular, with no murmurs or gallops.  No edema.  Otherwise, cardiac examination not performed today.     Last Labs:  CMP:  Recent Labs     08/11/24  0605 08/10/24  0526 08/09/24  0542 08/08/24  0532 08/07/24  0451    135* 137 135* 135*   K 3.5 3.6 3.9 4.4 3.7    104 104 101 101   CO2 26 22 26 23 26   ANIONGAP 12 13 11 15 12   BUN 5* 8 9 7 8   CREATININE 0.45* 0.47* 0.53 0.47* 0.55   EGFR >90 >90 >90 >90 >90   GLUCOSE 93 87 91 90 91     Recent Labs     08/06/24 2058 07/16/24  0710 08/03/23  0902 06/23/22  0920 06/01/21  0957   ALBUMIN 4.0 4.1 4.4 4.3 4.5   ALKPHOS 84 79 76 69 70   ALT 10 10 12 10 15   AST 12 15 17 16 20   BILITOT 0.8 0.6 0.5 0.8 0.5   LIPASE 9  --   --   --   --      CBC:  Recent Labs     08/11/24  0605 08/10/24  0526 08/09/24  0542 08/08/24  0532 08/07/24  0451   WBC 10.8 11.2 12.7* 13.1* 11.4*   HGB 11.4* 11.1* 11.6* 11.5* 12.0   HCT 34.6* 38.1 35.8* 35.5* 36.1   * 352 434 408 337   MCV 89 99 90 91 90     COAG:   Recent Labs     12/02/22  0850   DDIMERVTE 1,238*     HEME/ENDO:  Recent Labs     07/16/24  0710 08/03/23  0902 06/23/22  0920 06/01/21  0957   TSH 3.68 2.08 1.81 1.61      CARDIAC: No results for input(s): \"LDH\", \"CKMB\", \"TROPHS\", \"BNP\" in the last 51608 hours.    No lab exists for component: \"CK\", \"CKMBP\"  Recent Labs     07/16/24  0710 08/03/23  0902 06/23/22  0920 06/01/21  0957   CHOL 211* 214* 243* 244*   LDLF  --  130* 145* 139*   HDL 65.6 65.8 73.6 78.5   TRIG 105 90 124 134       Last Cardiology Tests:    ECG:    August 6, 2024 showed normal sinus rhythm, possible left atrial enlargement.    Echo:  Echo Results:  No results found for this or any previous visit from the past 3650 days.       Cath:      Stress Test:  Stress Results:  No results found for this or any previous visit from the past 365 days.         Cardiac Imaging:        Assessment/Plan     In summary, the patient has a " stable thoracic aortic aneurysm measuring 4.5 cm, essentially unchanged for nearly 10 years.  This was first recognized as an incidental finding on a screening coronary calcium score.  The calcium score was 0.  She does have modest hypercholesterolemia, but no other risk factors and we have not recommended lipid-lowering drug therapy.  She has lost about 8 pounds with the recent appendicitis, hopes to keep the weight off.  She will gradually resume an exercise regimen.  She is comfortable on low-dose metoprolol to keep the blood pressure low and the resting heart rate well-controlled.  Surveillance of the aneurysm can be at 18 to 24-month intervals at this time, probably alternating CT imaging and cardiac MRI.      Orders:  No orders of the defined types were placed in this encounter.     Followup Appts:  No future appointments.        ____________________________________________________________  Neeraj Ward MD    Senior Attending Physician  [facility]  [facility]    [facility] Chair for Cardiovascular Excellence  [employer] [facility] [employer]

## 2024-09-04 ENCOUNTER — TELEPHONE (OUTPATIENT)
Dept: SURGERY | Facility: CLINIC | Age: 69
End: 2024-09-04
Payer: COMMERCIAL

## 2024-09-04 NOTE — TELEPHONE ENCOUNTER
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  She reports that she has been doing well.   Last week felt constipated and then had a right sided abdominal fullness.  After taking Miralax had a bowel motion and the fullness in the abdomen improved.  Now just feels sore/discomfort if she palpates the abdomen.  Not taking any pain medications.  Denies n/v.  Denies fever/chills.  Up and about walking and able to complete ADL's.  I advised that I do expect her to have some soreness after surgery for a full six weeks.  She does not have any worrisome sx like fever/chills, nightsweats or constant unrelenting abdominal pain.  She does not want to take Tylenol or Ibuprofen for her discomfort.  Let's have her take Metamucil 1 tablespoon daily for the constipation.  Call back if she develops any worrisome symptoms.  If getting worse, will schedule office visit with Dr. Multani.  Otherwise I expect her to continue to heal and soreness to go away.  Margo Abernathy RN

## 2024-09-12 ENCOUNTER — OFFICE VISIT (OUTPATIENT)
Dept: OPHTHALMOLOGY | Facility: CLINIC | Age: 69
End: 2024-09-12
Payer: COMMERCIAL

## 2024-09-12 DIAGNOSIS — T15.12XA FOREIGN BODY OF CONJUNCTIVAL SAC OF LEFT EYE, INITIAL ENCOUNTER: ICD-10-CM

## 2024-09-12 DIAGNOSIS — H02.055 TRICHIASIS OF LEFT LOWER EYELID: Primary | ICD-10-CM

## 2024-09-12 DIAGNOSIS — H18.603 KERATOCONUS OF BOTH EYES: ICD-10-CM

## 2024-09-12 PROCEDURE — 67820 REVISE EYELASHES: CPT | Performed by: OPTOMETRIST

## 2024-09-12 ASSESSMENT — ENCOUNTER SYMPTOMS
HEMATOLOGIC/LYMPHATIC NEGATIVE: 0
NEUROLOGICAL NEGATIVE: 0
GASTROINTESTINAL NEGATIVE: 0
CARDIOVASCULAR NEGATIVE: 0
ALLERGIC/IMMUNOLOGIC NEGATIVE: 0
PSYCHIATRIC NEGATIVE: 0
MUSCULOSKELETAL NEGATIVE: 0
RESPIRATORY NEGATIVE: 0
CONSTITUTIONAL NEGATIVE: 0
ENDOCRINE NEGATIVE: 1
EYES NEGATIVE: 1

## 2024-09-12 ASSESSMENT — REFRACTION_CURRENTRX
OD_BASECURVE: 6.82
OS_BASECURVE: 6.60
OS_SPHERE: -10.00
OD_DIAMETER: 9.5
OD_SPHERE: -9.50
OS_DIAMETER: 9.5

## 2024-09-12 ASSESSMENT — VISUAL ACUITY
OS_CC: 20/40
OD_SC: 20/600
CORRECTION_TYPE: CONTACTS
METHOD: SNELLEN - LINEAR

## 2024-09-12 ASSESSMENT — EXTERNAL EXAM - RIGHT EYE: OD_EXAM: NORMAL

## 2024-09-12 ASSESSMENT — SLIT LAMP EXAM - LIDS: COMMENTS: GOOD POSITION

## 2024-09-12 ASSESSMENT — EXTERNAL EXAM - LEFT EYE: OS_EXAM: NORMAL

## 2024-09-12 NOTE — PROGRESS NOTES
Assessment/Plan   Diagnoses and all orders for this visit:  Keratoconus of both eyes  Trichiasis of left lower eyelid  Patient voiced consent. 1 drop of proparacaine instilled. Jeweler's forceps used to epilate lash(es). Patient tolerated procedure well. No complications.     Foreign body of conjunctival sac of left eye, initial encounter  No rigid gas permeable (RGP) found on lid eversion or forniceal sweep. Pt states lens may have fallen out. Recommend monitor.

## 2024-09-30 ENCOUNTER — TELEPHONE (OUTPATIENT)
Dept: OPHTHALMOLOGY | Facility: CLINIC | Age: 69
End: 2024-09-30
Payer: COMMERCIAL

## 2024-11-12 ENCOUNTER — HOSPITAL ENCOUNTER (OUTPATIENT)
Dept: RADIOLOGY | Facility: CLINIC | Age: 69
Discharge: HOME | End: 2024-11-12
Payer: COMMERCIAL

## 2024-11-12 VITALS — BODY MASS INDEX: 23.95 KG/M2 | WEIGHT: 158 LBS | HEIGHT: 68 IN

## 2024-11-12 DIAGNOSIS — Z12.31 ENCOUNTER FOR SCREENING MAMMOGRAM FOR MALIGNANT NEOPLASM OF BREAST: ICD-10-CM

## 2024-11-12 DIAGNOSIS — Z12.39 BREAST SCREENING: ICD-10-CM

## 2024-11-12 PROCEDURE — 77067 SCR MAMMO BI INCL CAD: CPT | Performed by: RADIOLOGY

## 2024-11-12 PROCEDURE — 77067 SCR MAMMO BI INCL CAD: CPT

## 2024-11-12 PROCEDURE — 77063 BREAST TOMOSYNTHESIS BI: CPT | Performed by: RADIOLOGY

## 2024-11-18 ENCOUNTER — LAB (OUTPATIENT)
Dept: LAB | Facility: LAB | Age: 69
End: 2024-11-18
Payer: COMMERCIAL

## 2024-11-18 DIAGNOSIS — Z00.00 ANNUAL PHYSICAL EXAM: ICD-10-CM

## 2024-11-18 LAB
EST. AVERAGE GLUCOSE BLD GHB EST-MCNC: 108 MG/DL
HBA1C MFR BLD: 5.4 %

## 2024-11-18 PROCEDURE — 83036 HEMOGLOBIN GLYCOSYLATED A1C: CPT

## 2024-11-18 PROCEDURE — 36415 COLL VENOUS BLD VENIPUNCTURE: CPT

## 2024-11-22 ENCOUNTER — OFFICE VISIT (OUTPATIENT)
Dept: OPHTHALMOLOGY | Facility: CLINIC | Age: 69
End: 2024-11-22
Payer: COMMERCIAL

## 2024-11-22 DIAGNOSIS — H02.055 TRICHIASIS OF LEFT LOWER EYELID: Primary | ICD-10-CM

## 2024-11-22 PROCEDURE — 99213 OFFICE O/P EST LOW 20 MIN: CPT | Performed by: STUDENT IN AN ORGANIZED HEALTH CARE EDUCATION/TRAINING PROGRAM

## 2024-11-22 ASSESSMENT — CONF VISUAL FIELD
OD_SUPERIOR_NASAL_RESTRICTION: 0
OD_INFERIOR_TEMPORAL_RESTRICTION: 0
OS_SUPERIOR_NASAL_RESTRICTION: 0
OS_INFERIOR_NASAL_RESTRICTION: 0
OD_NORMAL: 1
OS_NORMAL: 1
OD_SUPERIOR_TEMPORAL_RESTRICTION: 0
OS_INFERIOR_TEMPORAL_RESTRICTION: 0
OD_INFERIOR_NASAL_RESTRICTION: 0
OS_SUPERIOR_TEMPORAL_RESTRICTION: 0

## 2024-11-22 ASSESSMENT — VISUAL ACUITY
OD_CC+: +2
CORRECTION_TYPE: CONTACTS
OS_CC: 20/50
OS_CC+: +2
OD_CC: 20/40
METHOD: SNELLEN - LINEAR

## 2024-11-22 ASSESSMENT — EXTERNAL EXAM - LEFT EYE: OS_EXAM: NORMAL

## 2024-11-22 ASSESSMENT — ENCOUNTER SYMPTOMS
CONSTITUTIONAL NEGATIVE: 0
PSYCHIATRIC NEGATIVE: 0
NEUROLOGICAL NEGATIVE: 0
HEMATOLOGIC/LYMPHATIC NEGATIVE: 0
MUSCULOSKELETAL NEGATIVE: 0
GASTROINTESTINAL NEGATIVE: 0
EYES NEGATIVE: 0
ALLERGIC/IMMUNOLOGIC NEGATIVE: 0
ENDOCRINE NEGATIVE: 0
CARDIOVASCULAR NEGATIVE: 0
RESPIRATORY NEGATIVE: 0

## 2024-11-22 ASSESSMENT — SLIT LAMP EXAM - LIDS: COMMENTS: GOOD POSITION

## 2024-11-22 ASSESSMENT — EXTERNAL EXAM - RIGHT EYE: OD_EXAM: NORMAL

## 2024-12-04 DIAGNOSIS — M35.3 POLYMYALGIA RHEUMATICA (MULTI): ICD-10-CM

## 2024-12-04 DIAGNOSIS — Z00.00 PREVENTATIVE HEALTH CARE: ICD-10-CM

## 2024-12-05 RX ORDER — ERGOCALCIFEROL 1.25 MG/1
1 CAPSULE ORAL
Qty: 12 CAPSULE | Refills: 2 | Status: SHIPPED | OUTPATIENT
Start: 2024-12-08

## 2024-12-05 RX ORDER — PREDNISONE 1 MG/1
3 TABLET ORAL DAILY
Qty: 60 TABLET | Refills: 2 | Status: SHIPPED | OUTPATIENT
Start: 2024-12-05

## 2024-12-18 ENCOUNTER — HOSPITAL ENCOUNTER (OUTPATIENT)
Dept: RADIOLOGY | Facility: CLINIC | Age: 69
Discharge: HOME | End: 2024-12-18
Payer: COMMERCIAL

## 2024-12-18 DIAGNOSIS — R07.81 PAIN IN RIB: Primary | ICD-10-CM

## 2024-12-18 DIAGNOSIS — R07.81 PAIN IN RIB: ICD-10-CM

## 2024-12-18 PROCEDURE — 71100 X-RAY EXAM RIBS UNI 2 VIEWS: CPT | Mod: RT

## 2024-12-18 PROCEDURE — 71100 X-RAY EXAM RIBS UNI 2 VIEWS: CPT | Mod: RIGHT SIDE | Performed by: RADIOLOGY

## 2024-12-19 ENCOUNTER — TELEMEDICINE (OUTPATIENT)
Dept: PRIMARY CARE | Facility: CLINIC | Age: 69
End: 2024-12-19
Payer: COMMERCIAL

## 2024-12-19 VITALS — WEIGHT: 157 LBS | BODY MASS INDEX: 24.23 KG/M2

## 2024-12-19 DIAGNOSIS — M35.3 POLYMYALGIA RHEUMATICA (MULTI): ICD-10-CM

## 2024-12-19 DIAGNOSIS — E03.8 OTHER SPECIFIED HYPOTHYROIDISM: ICD-10-CM

## 2024-12-19 DIAGNOSIS — R07.81 RIB PAIN ON RIGHT SIDE: Primary | ICD-10-CM

## 2024-12-19 PROCEDURE — 99213 OFFICE O/P EST LOW 20 MIN: CPT | Performed by: INTERNAL MEDICINE

## 2024-12-19 ASSESSMENT — ENCOUNTER SYMPTOMS
NECK PAIN: 0
CHILLS: 0
FEVER: 0
MYALGIAS: 0
DIARRHEA: 0
DYSURIA: 0
SINUS PAIN: 0
ABDOMINAL PAIN: 0
COUGH: 0
WEAKNESS: 0
FATIGUE: 0
BLOOD IN STOOL: 0
PALPITATIONS: 0
HEADACHES: 0
CONFUSION: 0
DIZZINESS: 0
FREQUENCY: 0
SHORTNESS OF BREATH: 0
SORE THROAT: 0
ARTHRALGIAS: 0
NERVOUS/ANXIOUS: 0
CONSTIPATION: 0
BACK PAIN: 0

## 2024-12-19 NOTE — PROGRESS NOTES
Subjective   Patient ID: Dinora Lawler is a 69 y.o. female who presents for Rib Injury.    HPI patient is seen today for complaints of pain in her ribs on the right side after she picked heavy luggage during her travel.  She noticed sharp pain in the ribs and discomfort on moving and turning.  No shortness of breath.  No other joint pains.  Rib x-ray was ordered yesterday which was negative for fracture.  She has been applying Voltaren gel topically.  Medical history is significant for hypothyroidism, polymyalgia rheumatica-on prednisone.      Review of Systems   Constitutional:  Negative for chills, fatigue and fever.   HENT:  Negative for congestion, sinus pain and sore throat.    Respiratory:  Negative for cough and shortness of breath.    Cardiovascular:  Negative for chest pain, palpitations and leg swelling.   Gastrointestinal:  Negative for abdominal pain, blood in stool, constipation and diarrhea.   Genitourinary:  Negative for dysuria and frequency.   Musculoskeletal:  Negative for arthralgias, back pain, myalgias and neck pain.   Neurological:  Negative for dizziness, weakness and headaches.   Psychiatric/Behavioral:  Negative for confusion. The patient is not nervous/anxious.        Objective   Wt 71.2 kg (157 lb)   BMI 24.23 kg/m²     Physical Exam  Vitals reviewed.   Constitutional:       General: She is not in acute distress.     Appearance: Normal appearance.   HENT:      Mouth/Throat:      Mouth: Mucous membranes are moist.   Pulmonary:      Effort: No respiratory distress.      Breath sounds: Normal breath sounds.   Abdominal:      General: There is no distension.      Tenderness: There is no abdominal tenderness.   Musculoskeletal:         General: Tenderness present. Normal range of motion.      Comments: Right-sided rib tenderness under the breast   Neurological:      Mental Status: She is alert.      Coordination: Coordination normal.      Gait: Gait normal.   Psychiatric:         Mood and  Affect: Mood normal.         Behavior: Behavior normal.         Assessment/Plan   Diagnoses and all orders for this visit:  Rib pain on right side  Comments:  Rib x-ray-normal  Continue topical gel, Tylenol and monitor symptoms  Polymyalgia rheumatica (Multi)  Comments:  Continue prednisone 3 mg daily  Follow-up with rheumatology as scheduled  Other specified hypothyroidism  Comments:  Continue levothyroxine

## 2025-02-07 ENCOUNTER — APPOINTMENT (OUTPATIENT)
Dept: PRIMARY CARE | Facility: CLINIC | Age: 70
End: 2025-02-07
Payer: COMMERCIAL

## 2025-02-07 VITALS
WEIGHT: 163.6 LBS | SYSTOLIC BLOOD PRESSURE: 128 MMHG | TEMPERATURE: 97.6 F | HEART RATE: 76 BPM | BODY MASS INDEX: 25.25 KG/M2 | DIASTOLIC BLOOD PRESSURE: 82 MMHG

## 2025-02-07 DIAGNOSIS — B00.2 ORAL HERPES: ICD-10-CM

## 2025-02-07 DIAGNOSIS — M85.80 OSTEOPENIA, UNSPECIFIED LOCATION: ICD-10-CM

## 2025-02-07 DIAGNOSIS — R33.9 URINE RETENTION: ICD-10-CM

## 2025-02-07 DIAGNOSIS — E03.8 OTHER SPECIFIED HYPOTHYROIDISM: ICD-10-CM

## 2025-02-07 DIAGNOSIS — M35.3 POLYMYALGIA RHEUMATICA (MULTI): Primary | ICD-10-CM

## 2025-02-07 DIAGNOSIS — G47.09 OTHER INSOMNIA: ICD-10-CM

## 2025-02-07 PROBLEM — G82.50 QUADRIPLEGIA: Status: RESOLVED | Noted: 2024-08-20 | Resolved: 2025-02-07

## 2025-02-07 PROCEDURE — 99214 OFFICE O/P EST MOD 30 MIN: CPT | Performed by: INTERNAL MEDICINE

## 2025-02-07 RX ORDER — VALACYCLOVIR HYDROCHLORIDE 500 MG/1
500 TABLET, FILM COATED ORAL 2 TIMES DAILY PRN
Qty: 60 TABLET | Refills: 5 | Status: SHIPPED | OUTPATIENT
Start: 2025-02-07

## 2025-02-07 RX ORDER — CATHETER 8 FR-10"
1 EACH MISCELLANEOUS DAILY
Qty: 50 EACH | Refills: 3 | Status: SHIPPED | OUTPATIENT
Start: 2025-02-07

## 2025-02-07 RX ORDER — PREDNISONE 1 MG/1
3 TABLET ORAL DAILY
Qty: 270 TABLET | Refills: 1 | Status: SHIPPED | OUTPATIENT
Start: 2025-02-07

## 2025-02-07 RX ORDER — ESZOPICLONE 2 MG/1
2 TABLET, FILM COATED ORAL NIGHTLY PRN
Qty: 30 TABLET | Refills: 5 | Status: SHIPPED | OUTPATIENT
Start: 2025-02-07 | End: 2025-04-08

## 2025-02-07 ASSESSMENT — ENCOUNTER SYMPTOMS
NECK PAIN: 0
DYSURIA: 0
ABDOMINAL PAIN: 0
ARTHRALGIAS: 1
CONSTIPATION: 0
SINUS PAIN: 0
COUGH: 0
HEADACHES: 0
SHORTNESS OF BREATH: 0
FEVER: 0
CHILLS: 0
CONFUSION: 0
BLOOD IN STOOL: 0
MYALGIAS: 0
PALPITATIONS: 0
BACK PAIN: 1
DIARRHEA: 0
SORE THROAT: 0
WEAKNESS: 0
FATIGUE: 0
NERVOUS/ANXIOUS: 0
DIZZINESS: 0
FREQUENCY: 0

## 2025-02-07 NOTE — PROGRESS NOTES
Subjective   Patient ID: Dinora Lawler is a 69 y.o. female who presents for Med Refill (Pt present today for medication refill. ls).    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  Her medical history is significant for hypothyroidism, PMR on prednisone 3 mg, chronic insomnia, oral herpes.  She is here for medication refill.  She is running out of Ambien.  She has tried Lunesta in the past.  She sees cardiology every 6 months for thoracic aortic aneurysm.  Size-4.3 x 4.6 cm.  She would like to get refills on her sleep medication.  She is also noticing herpes outbreak and would like to get refill on Valtrex.  She exercises regularly, works with physical therapy once a week.  She does a mix of cardio and strength training.    Review of Systems   Constitutional:  Negative for chills, fatigue and fever.   HENT:  Negative for congestion, sinus pain and sore throat.    Respiratory:  Negative for cough and shortness of breath.    Cardiovascular:  Negative for chest pain, palpitations and leg swelling.   Gastrointestinal:  Negative for abdominal pain, blood in stool, constipation and diarrhea.   Genitourinary:  Negative for dysuria and frequency.   Musculoskeletal:  Positive for arthralgias and back pain. Negative for myalgias and neck pain.        Neck,shoulder pain   Neurological:  Negative for dizziness, weakness and headaches.   Psychiatric/Behavioral:  Negative for confusion. The patient is not nervous/anxious.        Objective   /82   Pulse 76   Temp 36.4 °C (97.6 °F)   Wt 74.2 kg (163 lb 9.6 oz)   BMI 25.25 kg/m²     Physical Exam  Vitals reviewed.   Constitutional:       General: She is not in acute distress.     Appearance: Normal appearance.   HENT:      Head: Normocephalic and atraumatic.   Cardiovascular:      Rate and Rhythm: Normal rate and regular rhythm.      Pulses: Normal pulses.   Pulmonary:      Effort: Pulmonary effort is normal. No respiratory  "distress.      Breath sounds: Normal breath sounds.   Abdominal:      General: Bowel sounds are normal. There is no distension.      Tenderness: There is no abdominal tenderness.   Musculoskeletal:         General: No swelling or tenderness. Normal range of motion.      Cervical back: Normal range of motion.   Skin:     General: Skin is warm.   Neurological:      General: No focal deficit present.      Mental Status: She is alert.      Coordination: Coordination normal.      Gait: Gait normal.   Psychiatric:         Mood and Affect: Mood normal.         Behavior: Behavior normal.         Assessment/Plan   Diagnoses and all orders for this visit:  Polymyalgia rheumatica (Multi)  Comments:  Continue prednisone 3 mg daily  Osteopenia-bone density ordered due to chronic steroid use  Orders:  -     predniSONE (Deltasone) 1 mg tablet; Take 3 tablets (3 mg) by mouth once daily.  Other specified hypothyroidism  Comments:  Continue levothyroxine  Osteopenia, unspecified location  Comments:  Repeat bone density  Orders:  -     XR DEXA bone density; Future  Other insomnia  Comments:  DC Ambien  Restart Lunesta 2 mg every night  Orders:  -     eszopiclone (Lunesta) 2 mg tablet; Take 1 tablet (2 mg) by mouth as needed at bedtime for sleep. Take immediately before bedtime  Oral herpes  Comments:  Refill Valtrex-500 mg twice daily as needed  Orders:  -     valACYclovir (Valtrex) 500 mg tablet; Take 1 tablet (500 mg) by mouth 2 times a day as needed (herpes).  Urine retention  Comments:  She needs refills on catheters  Orders:  -     catheter (Bard Clean-Cath) 8 Fr-10 \" misc; 1 each once daily.    Follow-up in 6 months or sooner if needed     "

## 2025-02-08 DIAGNOSIS — J30.9 ALLERGIC RHINITIS, UNSPECIFIED: ICD-10-CM

## 2025-02-10 ENCOUNTER — APPOINTMENT (OUTPATIENT)
Dept: OPHTHALMOLOGY | Facility: CLINIC | Age: 70
End: 2025-02-10
Payer: COMMERCIAL

## 2025-02-10 DIAGNOSIS — H02.89 DISTICHIASIS: Primary | ICD-10-CM

## 2025-02-10 DIAGNOSIS — H18.603 KERATOCONUS OF BOTH EYES: ICD-10-CM

## 2025-02-10 PROCEDURE — 99213 OFFICE O/P EST LOW 20 MIN: CPT

## 2025-02-10 RX ORDER — FLUTICASONE PROPIONATE 50 MCG
SPRAY, SUSPENSION (ML) NASAL
Qty: 48 ML | Refills: 2 | Status: SHIPPED | OUTPATIENT
Start: 2025-02-10

## 2025-02-10 ASSESSMENT — TONOMETRY
IOP_METHOD: GOLDMANN APPLANATION
OS_IOP_MMHG: CL
OD_IOP_MMHG: CL

## 2025-02-10 ASSESSMENT — VISUAL ACUITY
OS_PH_CC: 20/40
CORRECTION_TYPE: GLASSES
OS_CC: 20/80
METHOD: SNELLEN - LINEAR
OD_CC: 20/30

## 2025-02-10 ASSESSMENT — EXTERNAL EXAM - RIGHT EYE: OD_EXAM: NORMAL

## 2025-02-10 ASSESSMENT — EXTERNAL EXAM - LEFT EYE: OS_EXAM: NORMAL

## 2025-02-10 NOTE — PROGRESS NOTES
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 Hightower (follows for KCN wears RGPs OU) referred here for misdirected lashes of NATHAN & LLL. Denies history of trauma or other eyelid problems.  Patient noticing FBS, irritation, and redness in both eyes. Unclear timeline of onset.   Patient also noting that at previous appointments there has been difficulty in sizing the contact lenses for appropriate fit    Pertinent Exam Findings  Left upper eyelid (NATHAN) distichiasis on medial aspect; aberrant lashes are superior to ciliary line and not contacting the eye, but pt reports itchiness/irritation specifically in this location  Left lower lid (LLL) distichiasis centrally; ~3 lashes with K contact  No signs of cicatrizing conjunctivitis  ++ mobility of contact lenses during exam    Assessment/Plan  Distichiasis of left upper eyelid (NATHAN) and left lower lid (LLL) causing symptomatic irritation/itchiness. Intervention is warranted to resolve symptoms and prevent scarring/scratching/infection of ocular surface.  Discussed r/b/a of hyfrecation of the involved lashes for permanent resolution. Pt is interested and would like to proceed. Will obtain prior auth and perform procedure in office at next visit  Manual epilation of all culprit eyelashes performed today without complication.  Of note, ++ mobility of GPCTL OU today. This may be contributing to the FBS OU and I have advised the pt to discuss refitting with Dr. Hightower at her upcoming appointment in March. If FBS persists after definitive management of distichiasis, further adjustments regarding GPCTL and KCN may be warranted.  F/U in 2 months for procedure as noted above. Sooner with any changes or worsening.

## 2025-02-12 ASSESSMENT — PUNCTA - ASSESSMENT
OS_PUNCTA: NORMAL
OD_PUNCTA: NORMAL

## 2025-03-06 ENCOUNTER — HOSPITAL ENCOUNTER (OUTPATIENT)
Dept: RADIOLOGY | Facility: CLINIC | Age: 70
Discharge: HOME | End: 2025-03-06
Payer: COMMERCIAL

## 2025-03-06 DIAGNOSIS — M85.80 OSTEOPENIA, UNSPECIFIED LOCATION: ICD-10-CM

## 2025-03-06 PROCEDURE — 77080 DXA BONE DENSITY AXIAL: CPT

## 2025-03-13 ENCOUNTER — APPOINTMENT (OUTPATIENT)
Dept: OPHTHALMOLOGY | Facility: CLINIC | Age: 70
End: 2025-03-13
Payer: COMMERCIAL

## 2025-03-13 DIAGNOSIS — H44.23 UNCOMPLICATED DEGENERATIVE MYOPIA OF BOTH EYES: ICD-10-CM

## 2025-03-13 DIAGNOSIS — H52.213 IRREGULAR ASTIGMATISM OF BOTH EYES: ICD-10-CM

## 2025-03-13 DIAGNOSIS — H17.9 LEFT CORNEA SCAR: ICD-10-CM

## 2025-03-13 DIAGNOSIS — H52.4 MYOPIA OF BOTH EYES WITH ASTIGMATISM AND PRESBYOPIA: ICD-10-CM

## 2025-03-13 DIAGNOSIS — H52.13 MYOPIA OF BOTH EYES WITH ASTIGMATISM AND PRESBYOPIA: ICD-10-CM

## 2025-03-13 DIAGNOSIS — H25.13 CATARACT, NUCLEAR SCLEROTIC, BOTH EYES: ICD-10-CM

## 2025-03-13 DIAGNOSIS — H40.003 GLAUCOMA SUSPECT OF BOTH EYES: Primary | ICD-10-CM

## 2025-03-13 DIAGNOSIS — H18.603 KERATOCONUS OF BOTH EYES: ICD-10-CM

## 2025-03-13 DIAGNOSIS — H52.203 MYOPIA OF BOTH EYES WITH ASTIGMATISM AND PRESBYOPIA: ICD-10-CM

## 2025-03-13 PROCEDURE — 92014 COMPRE OPH EXAM EST PT 1/>: CPT | Performed by: OPTOMETRIST

## 2025-03-13 PROCEDURE — 92015 DETERMINE REFRACTIVE STATE: CPT | Performed by: OPTOMETRIST

## 2025-03-13 PROCEDURE — 92250 FUNDUS PHOTOGRAPHY W/I&R: CPT | Performed by: OPTOMETRIST

## 2025-03-13 ASSESSMENT — ENCOUNTER SYMPTOMS
ENDOCRINE NEGATIVE: 0
RESPIRATORY NEGATIVE: 0
ALLERGIC/IMMUNOLOGIC NEGATIVE: 0
HEMATOLOGIC/LYMPHATIC NEGATIVE: 0
NEUROLOGICAL NEGATIVE: 0
CONSTITUTIONAL NEGATIVE: 0
MUSCULOSKELETAL NEGATIVE: 0
CARDIOVASCULAR NEGATIVE: 0
PSYCHIATRIC NEGATIVE: 0
EYES NEGATIVE: 1
GASTROINTESTINAL NEGATIVE: 0

## 2025-03-13 ASSESSMENT — VISUAL ACUITY
VA_OR_OS_CURRENT_RX: 20/40
OS_CC: 20/40
CORRECTION_TYPE: CONTACTS
OD_CC+: -2
VA_OR_OD_CURRENT_RX: 20/30
VA_OR_OD_CURRENT_RX: 20/30
METHOD: SNELLEN - LINEAR
VA_OR_OS_CURRENT_RX: 20/40
OD_CC: 20/30

## 2025-03-13 ASSESSMENT — REFRACTION_MANIFEST
OS_ADD: +2.50
OD_CYLINDER: -0.50
OS_CYLINDER: SPHERE
OS_SPHERE: -9.00
OD_SPHERE: -9.00
OD_ADD: +2.50
OD_AXIS: 125

## 2025-03-13 ASSESSMENT — CONF VISUAL FIELD
OD_INFERIOR_NASAL_RESTRICTION: 0
OD_INFERIOR_TEMPORAL_RESTRICTION: 0
OS_INFERIOR_TEMPORAL_RESTRICTION: 0
OD_SUPERIOR_TEMPORAL_RESTRICTION: 0
OD_NORMAL: 1
OD_SUPERIOR_NASAL_RESTRICTION: 0
OS_NORMAL: 1
OS_SUPERIOR_NASAL_RESTRICTION: 0
OS_INFERIOR_NASAL_RESTRICTION: 0
OS_SUPERIOR_TEMPORAL_RESTRICTION: 0

## 2025-03-13 ASSESSMENT — REFRACTION_CURRENTRX
OD_DIAMETER: 9.5
OS_SPHERE: -10.00
OS_DIAMETER: 9.5
OS_SPHERE: -9.00
OD_SPHERE: -9.50
OS_DIAMETER: 9.5
OS_BASECURVE: 6.60
OD_DIAMETER: 9.5
OS_BASECURVE: 6.60
OD_SPHERE: -9.50
OD_BASECURVE: 6.82
OD_BASECURVE: 6.82

## 2025-03-13 ASSESSMENT — EXTERNAL EXAM - LEFT EYE: OS_EXAM: NORMAL

## 2025-03-13 ASSESSMENT — EXTERNAL EXAM - RIGHT EYE: OD_EXAM: NORMAL

## 2025-03-13 ASSESSMENT — TONOMETRY
OD_IOP_MMHG: 16
IOP_METHOD: GOLDMANN APPLANATION
OS_IOP_MMHG: 16

## 2025-03-13 ASSESSMENT — CUP TO DISC RATIO
OD_RATIO: .8
OS_RATIO: .7

## 2025-03-13 NOTE — PROGRESS NOTES
IOP 16/16 IOP is excellent but skewed by thinner cornea.  GLC suspect with OD stable and OS ? reduction in RNFL.    Optical coherence tomography of the retinal nerve fiber layer (RNFL) revealed:    OD: Reduced thickness in S and I sectors with an average RNFL thickness of unable was 49 was 59 was 55 micron. was 57   OS: Borderline thickness in S sectors with an average RNFL thickness of unable was 79 was 77 (may have error) was 83 micron was 103     VA cc 20/30 OD, 20/40 OS.  KCN and new RGP ordered.  Needs PA and ABN done.  send to patient.  VA cRGP 20/25 OD and 20/30 OS.  Is excellent and stable.     Moderate cataracts and will monitor.     A spectacle prescription was given at the patient`s request.  May not fill.     Trichiasis LLL and epilated 3 lashes in the past. Has an appointment with Dr Isidro to have eyelashes permanently removed. Recommend to do this.     DFE completed. A spectacle prescription was dispensed to be used as needed. A contact lens prescription was dispensed at the patient`s request.     Optos completed. Retinal multimodal imaging including photography was completed, and the findings are described in the examination.     A contact lens prescription was dispensed at the patient's request. Put in all the peripheral curves and OZ measurements.    A spectacle prescription was dispensed to be used as needed. VA corrects to 20/80 cc 20/30 20/40 OD/OS with RGPs.     RTC 6 months VA BAT IOP VF 24-2 OCT RNFL Optos and consider referral for cataract surgery.    Spoke with patients mother at work and informed her of normal lead and hemoglobin levels. She understood and had no questions.

## 2025-03-26 ENCOUNTER — TELEPHONE (OUTPATIENT)
Dept: OPHTHALMOLOGY | Facility: CLINIC | Age: 70
End: 2025-03-26
Payer: COMMERCIAL

## 2025-03-26 NOTE — TELEPHONE ENCOUNTER
Patient had reached out to  asking that the team be reached for questioning.  Patient was called but quickly told RN that they'd call back in two minutes.  Still waiting for a call.

## 2025-03-26 NOTE — TELEPHONE ENCOUNTER
Spoke with patient about the procedure and transportation.  Highly advised that they be able to obtain a  for the procedure.  Patient verbalizes understanding.

## 2025-04-23 ENCOUNTER — APPOINTMENT (OUTPATIENT)
Dept: OPHTHALMOLOGY | Facility: CLINIC | Age: 70
End: 2025-04-23
Payer: COMMERCIAL

## 2025-04-23 DIAGNOSIS — H02.89 DISTICHIASIS: Primary | ICD-10-CM

## 2025-04-23 DIAGNOSIS — H02.055 TRICHIASIS OF LEFT LOWER EYELID: ICD-10-CM

## 2025-04-23 PROCEDURE — 67825 REVISE EYELASHES: CPT | Mod: LEFT SIDE

## 2025-04-23 ASSESSMENT — VISUAL ACUITY
OD_PH_CC+: +2
METHOD: SNELLEN - LINEAR
CORRECTION_TYPE: CONTACTS
OS_PH_CC+: +1
OS_PH_CC: 20/30
OD_CC: 20/40
OS_CC: 20/60
OD_PH_CC: 20/40

## 2025-04-23 ASSESSMENT — TONOMETRY
OS_IOP_MMHG: CL
OD_IOP_MMHG: CL
IOP_METHOD: GOLDMANN APPLANATION

## 2025-04-23 ASSESSMENT — CONF VISUAL FIELD
OS_INFERIOR_NASAL_RESTRICTION: 0
OS_INFERIOR_TEMPORAL_RESTRICTION: 0
OS_NORMAL: 1
OS_SUPERIOR_NASAL_RESTRICTION: 0
OD_INFERIOR_NASAL_RESTRICTION: 0
OD_INFERIOR_TEMPORAL_RESTRICTION: 0
OS_SUPERIOR_TEMPORAL_RESTRICTION: 0
OD_NORMAL: 1
OD_SUPERIOR_TEMPORAL_RESTRICTION: 0
OD_SUPERIOR_NASAL_RESTRICTION: 0

## 2025-04-23 ASSESSMENT — EXTERNAL EXAM - LEFT EYE: OS_EXAM: NORMAL

## 2025-04-23 ASSESSMENT — EXTERNAL EXAM - RIGHT EYE: OD_EXAM: NORMAL

## 2025-04-23 NOTE — PROGRESS NOTES
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 Hightower (follows for [facility] wears RGPs OU) referred here for misdirected lashes of NATHAN & LLL. Denies history of trauma or other eyelid problems.  Patient noticing FBS, irritation, and redness in both eyes. Unclear timeline of onset.   Patient also noting that at previous appointments there has been difficulty in sizing the contact lenses for appropriate fit    Pertinent Exam Findings  Left lower lid (LLL) distichiasis centrally; ~6 lashes with K contact  No signs of cicatrizing conjunctivitis  ++ mobility of contact lenses during exam    Assessment/Plan  Distichiasis of left lower lid (LLL) causing symptomatic irritation/itchiness. Intervention is warranted to resolve symptoms and prevent scarring/scratching/infection of ocular surface.    Saw Dr. Hightower for CTL adjustment but fit was deemed appropriate so adjustment of CTL fit not an option for relief of symptoms.    Recurrent distichiasis/trichiasis LEFT LOWER eyelid. If desired, can undergo electroepilation. Risks of electroepilation explained in detail, including but not limited to recurrent distichiasis, infection, bruising, notching, eyelid malposition. Understands there is only an 80% success rate; lashes may recur and may need additional therapy. Risks of irritation, recurrence, and lid notching explained. Pt understands and wishes to proceed.    Procedure performed successfully in office today and tolerated well with no complications. Postprocedural instructions and return precautions reviewed. Erythromycin boyd tube provided to pt directly and instructed to use BID x 2 weeks.     F/U in 3 months for procedure as noted above. Sooner with any changes or worsening.

## 2025-04-28 DIAGNOSIS — M35.3 POLYMYALGIA RHEUMATICA (MULTI): ICD-10-CM

## 2025-04-28 RX ORDER — PREDNISONE 1 MG/1
3 TABLET ORAL DAILY
Qty: 270 TABLET | Refills: 1 | Status: SHIPPED | OUTPATIENT
Start: 2025-04-28

## 2025-06-12 DIAGNOSIS — I77.810 THORACIC AORTIC ECTASIA: ICD-10-CM

## 2025-06-12 RX ORDER — METOPROLOL SUCCINATE 25 MG/1
25 TABLET, EXTENDED RELEASE ORAL DAILY
Qty: 90 TABLET | Refills: 3 | Status: SHIPPED | OUTPATIENT
Start: 2025-06-12

## 2025-06-20 ENCOUNTER — OFFICE VISIT (OUTPATIENT)
Dept: OPHTHALMOLOGY | Facility: CLINIC | Age: 70
End: 2025-06-20
Payer: COMMERCIAL

## 2025-06-20 DIAGNOSIS — H02.89 DISTICHIASIS: Primary | ICD-10-CM

## 2025-06-20 DIAGNOSIS — H02.055 TRICHIASIS OF LEFT LOWER EYELID: ICD-10-CM

## 2025-06-20 PROCEDURE — 99213 OFFICE O/P EST LOW 20 MIN: CPT

## 2025-06-20 ASSESSMENT — TONOMETRY
IOP_METHOD: GOLDMANN APPLANATION
OD_IOP_MMHG: CLS
OS_IOP_MMHG: CLS

## 2025-06-20 ASSESSMENT — VISUAL ACUITY
METHOD: SNELLEN - LINEAR
OS_PH_CC: 20/30
OS_CC+: +1
OD_CC: 20/40
OS_CC: 20/60
CORRECTION_TYPE: CONTACTS
OD_PH_CC+: +2
OD_PH_CC: 20/40

## 2025-06-20 ASSESSMENT — EXTERNAL EXAM - LEFT EYE: OS_EXAM: NORMAL

## 2025-06-20 ASSESSMENT — EXTERNAL EXAM - RIGHT EYE: OD_EXAM: NORMAL

## 2025-06-30 NOTE — PROGRESS NOTES
Recurrent trichiasis/distichiasis affecting the LEFT UPPER and LOWER eyelids and causing ocular surface irritation and/or damage due to contact from the involved eyelashes. If definitive correction desired, recommended definitive intervention with electroepilation of the involved aberrant eyelashes. Risks of electroepilation explained in detail, including but not limited to recurrent distichiasis, infection, bruising, notching, and eyelid malposition. Patient understands there is only an 80% success rate; lashes may recur and may need additional intervention. Risks of irritation, recurrence, and lid notching emphasized. All questions answered.     Patient expressed good understanding but wishes to avoid any procedures at present as she is going on a trip.     Will RTC in 1 month for reassessment and consideration of procedure. Patient instructed to call with any problems or changes.

## 2025-07-30 ENCOUNTER — APPOINTMENT (OUTPATIENT)
Dept: OPHTHALMOLOGY | Facility: CLINIC | Age: 70
End: 2025-07-30
Payer: COMMERCIAL

## 2025-07-30 DIAGNOSIS — H02.89 DISTICHIASIS: Primary | ICD-10-CM

## 2025-07-30 DIAGNOSIS — H02.055 TRICHIASIS OF LEFT LOWER EYELID: ICD-10-CM

## 2025-07-30 DIAGNOSIS — H02.881 MEIBOMIAN GLAND DYSFUNCTION (MGD) OF RIGHT UPPER EYELID: ICD-10-CM

## 2025-07-30 PROCEDURE — 99213 OFFICE O/P EST LOW 20 MIN: CPT

## 2025-07-30 RX ORDER — ERYTHROMYCIN 5 MG/G
OINTMENT OPHTHALMIC
Qty: 3.5 G | Refills: 1 | Status: SHIPPED | OUTPATIENT
Start: 2025-07-30

## 2025-07-30 ASSESSMENT — VISUAL ACUITY
METHOD: SNELLEN - LINEAR
OS_PH_CC: 20/40
OS_CC: 20/50
OS_CC+: -2
CORRECTION_TYPE: CONTACTS
OD_PH_CC+: +2
OD_PH_CC: 20/40
OD_CC: 20/40

## 2025-07-30 ASSESSMENT — EXTERNAL EXAM - RIGHT EYE: OD_EXAM: NORMAL

## 2025-07-30 ASSESSMENT — TONOMETRY
IOP_METHOD: TONOPEN
OS_IOP_MMHG: 13
OD_IOP_MMHG: 13

## 2025-07-30 ASSESSMENT — EXTERNAL EXAM - LEFT EYE: OS_EXAM: NORMAL

## 2025-07-30 NOTE — PROGRESS NOTES
Assessment/Plan  Trichiasis/Distichiasis  Manual epilation performed at last visit on NATHAN and LLL  Today only has 2x aberrant lashes on LLL with K contact  Manually epilated without complication    Meibomian Gland Dysfunction  Pt reporting FBS/irritation in medial RUL  Exam shows focally inspissated MG in this location  No aberrant lashes  No concerning features such as madarosis/ulceration/telangiectasia  Likely represents early/resolving stye  Recommended WC + erythromycin Tono (hx of glaucoma suspect therefore no maxitrol)      RTC 4-6 weeks. Sooner with any acute changes or worsening.

## 2025-08-21 ENCOUNTER — APPOINTMENT (OUTPATIENT)
Dept: PRIMARY CARE | Facility: CLINIC | Age: 70
End: 2025-08-21
Payer: COMMERCIAL

## 2025-08-26 DIAGNOSIS — G47.09 OTHER INSOMNIA: ICD-10-CM

## 2025-08-27 ENCOUNTER — APPOINTMENT (OUTPATIENT)
Dept: OPHTHALMOLOGY | Facility: CLINIC | Age: 70
End: 2025-08-27
Payer: COMMERCIAL

## 2025-08-27 DIAGNOSIS — H02.89 DISTICHIASIS: ICD-10-CM

## 2025-08-27 DIAGNOSIS — H02.055 TRICHIASIS OF LEFT LOWER EYELID WITHOUT ENTROPION: Primary | ICD-10-CM

## 2025-08-27 DIAGNOSIS — H02.881 MEIBOMIAN GLAND DYSFUNCTION (MGD) OF RIGHT UPPER EYELID: ICD-10-CM

## 2025-08-27 PROCEDURE — 99213 OFFICE O/P EST LOW 20 MIN: CPT

## 2025-08-27 ASSESSMENT — TONOMETRY
OD_IOP_MMHG: 21
IOP_METHOD: TONOPEN
OS_IOP_MMHG: 20

## 2025-08-27 ASSESSMENT — VISUAL ACUITY
OS_CC+: +1
CORRECTION_TYPE: CONTACTS
OS_PH_CC: 20/30
OS_CC: 20/40
OD_CC: 20/30
METHOD: SNELLEN - LINEAR
OD_CC+: -2
OS_PH_CC+: +2

## 2025-08-27 ASSESSMENT — EXTERNAL EXAM - RIGHT EYE: OD_EXAM: NORMAL

## 2025-08-27 ASSESSMENT — EXTERNAL EXAM - LEFT EYE: OS_EXAM: NORMAL

## 2025-08-29 ENCOUNTER — APPOINTMENT (OUTPATIENT)
Dept: PRIMARY CARE | Facility: CLINIC | Age: 70
End: 2025-08-29
Payer: COMMERCIAL

## 2025-08-29 ENCOUNTER — OFFICE VISIT (OUTPATIENT)
Dept: PRIMARY CARE | Facility: CLINIC | Age: 70
End: 2025-08-29
Payer: COMMERCIAL

## 2025-08-29 VITALS
TEMPERATURE: 98 F | HEART RATE: 75 BPM | HEIGHT: 68 IN | WEIGHT: 160 LBS | BODY MASS INDEX: 24.25 KG/M2 | SYSTOLIC BLOOD PRESSURE: 128 MMHG | DIASTOLIC BLOOD PRESSURE: 79 MMHG

## 2025-08-29 DIAGNOSIS — Z00.00 ANNUAL PHYSICAL EXAM: ICD-10-CM

## 2025-08-29 DIAGNOSIS — E03.8 OTHER SPECIFIED HYPOTHYROIDISM: Primary | ICD-10-CM

## 2025-08-29 DIAGNOSIS — E55.9 VITAMIN D DEFICIENCY: ICD-10-CM

## 2025-08-29 DIAGNOSIS — E78.49 OTHER HYPERLIPIDEMIA: ICD-10-CM

## 2025-08-29 DIAGNOSIS — G47.09 OTHER INSOMNIA: ICD-10-CM

## 2025-08-29 DIAGNOSIS — M35.3 POLYMYALGIA RHEUMATICA (MULTI): ICD-10-CM

## 2025-08-29 PROCEDURE — 99213 OFFICE O/P EST LOW 20 MIN: CPT | Performed by: INTERNAL MEDICINE

## 2025-08-29 PROCEDURE — 99397 PER PM REEVAL EST PAT 65+ YR: CPT | Performed by: INTERNAL MEDICINE

## 2025-08-29 PROCEDURE — 1126F AMNT PAIN NOTED NONE PRSNT: CPT | Performed by: INTERNAL MEDICINE

## 2025-08-29 PROCEDURE — 3008F BODY MASS INDEX DOCD: CPT | Performed by: INTERNAL MEDICINE

## 2025-08-29 PROCEDURE — 1036F TOBACCO NON-USER: CPT | Performed by: INTERNAL MEDICINE

## 2025-08-29 RX ORDER — ZOLPIDEM TARTRATE 10 MG/1
10 TABLET ORAL NIGHTLY PRN
Qty: 30 TABLET | Refills: 3 | Status: SHIPPED | OUTPATIENT
Start: 2025-08-29 | End: 2025-10-28

## 2025-08-29 RX ORDER — ESZOPICLONE 2 MG/1
2 TABLET, FILM COATED ORAL NIGHTLY PRN
Qty: 30 TABLET | Refills: 5 | OUTPATIENT
Start: 2025-08-29 | End: 2025-10-28

## 2025-08-29 ASSESSMENT — ENCOUNTER SYMPTOMS
SINUS PAIN: 0
DIARRHEA: 0
DIZZINESS: 0
FREQUENCY: 0
HEADACHES: 0
CHILLS: 0
BACK PAIN: 0
FEVER: 0
BLOOD IN STOOL: 0
DYSURIA: 0
NERVOUS/ANXIOUS: 0
CONFUSION: 0
INSOMNIA: 1
ABDOMINAL PAIN: 0
ARTHRALGIAS: 0
FATIGUE: 0
MYALGIAS: 0
NECK PAIN: 0
PALPITATIONS: 0
SHORTNESS OF BREATH: 0
WEAKNESS: 0
COUGH: 0
SORE THROAT: 0
CONSTIPATION: 0

## 2025-08-29 ASSESSMENT — PAIN SCALES - GENERAL: PAINLEVEL_OUTOF10: 0-NO PAIN

## 2025-09-03 LAB
25(OH)D3+25(OH)D2 SERPL-MCNC: 29 NG/ML (ref 30–100)
ALBUMIN SERPL-MCNC: 4.3 G/DL (ref 3.6–5.1)
ALP SERPL-CCNC: 74 U/L (ref 37–153)
ALT SERPL-CCNC: 11 U/L (ref 6–29)
AMPHETAMINES UR QL: NEGATIVE NG/ML
ANION GAP SERPL CALCULATED.4IONS-SCNC: 6 MMOL/L (CALC) (ref 7–17)
AST SERPL-CCNC: 16 U/L (ref 10–35)
BARBITURATES UR QL: NEGATIVE NG/ML
BENZODIAZ UR QL: NEGATIVE NG/ML
BILIRUB SERPL-MCNC: 0.6 MG/DL (ref 0.2–1.2)
BUN SERPL-MCNC: 17 MG/DL (ref 7–25)
BZE UR QL: NEGATIVE NG/ML
CALCIUM SERPL-MCNC: 9.2 MG/DL (ref 8.6–10.4)
CHLORIDE SERPL-SCNC: 106 MMOL/L (ref 98–110)
CHOLEST SERPL-MCNC: 221 MG/DL
CHOLEST/HDLC SERPL: 2.9 (CALC)
CO2 SERPL-SCNC: 28 MMOL/L (ref 20–32)
CREAT SERPL-MCNC: 0.49 MG/DL (ref 0.6–1)
CREAT UR-MCNC: 89.5 MG/DL
EGFRCR SERPLBLD CKD-EPI 2021: 101 ML/MIN/1.73M2
ERYTHROCYTE [DISTWIDTH] IN BLOOD BY AUTOMATED COUNT: 13.1 % (ref 11–15)
EST. AVERAGE GLUCOSE BLD GHB EST-MCNC: 111 MG/DL
EST. AVERAGE GLUCOSE BLD GHB EST-SCNC: 6.2 MMOL/L
FENTANYL UR QL SCN: NEGATIVE NG/ML
GLUCOSE SERPL-MCNC: 91 MG/DL (ref 65–99)
HBA1C MFR BLD: 5.5 %
HCT VFR BLD AUTO: 40.3 % (ref 35–45)
HDLC SERPL-MCNC: 75 MG/DL
HGB BLD-MCNC: 12.6 G/DL (ref 11.7–15.5)
LDLC SERPL CALC-MCNC: 126 MG/DL (CALC)
MCH RBC QN AUTO: 29.4 PG (ref 27–33)
MCHC RBC AUTO-ENTMCNC: 31.3 G/DL (ref 32–36)
MCV RBC AUTO: 93.9 FL (ref 80–100)
METHADONE UR QL: NEGATIVE NG/ML
NONHDLC SERPL-MCNC: 146 MG/DL (CALC)
OPIATES UR QL: NEGATIVE NG/ML
OXIDANTS UR QL: NEGATIVE MCG/ML
OXYCODONE UR QL: NEGATIVE NG/ML
PCP UR QL: NEGATIVE NG/ML
PH UR: 7.5 [PH] (ref 4.5–9)
PLATELET # BLD AUTO: 278 THOUSAND/UL (ref 140–400)
PMV BLD REES-ECKER: 9.8 FL (ref 7.5–12.5)
POTASSIUM SERPL-SCNC: 4.3 MMOL/L (ref 3.5–5.3)
PROT SERPL-MCNC: 6.8 G/DL (ref 6.1–8.1)
QUEST NOTES AND COMMENTS: NORMAL
RBC # BLD AUTO: 4.29 MILLION/UL (ref 3.8–5.1)
SODIUM SERPL-SCNC: 140 MMOL/L (ref 135–146)
THC UR QL: NEGATIVE NG/ML
TRIGL SERPL-MCNC: 102 MG/DL
TSH SERPL-ACNC: 2.49 MIU/L (ref 0.4–4.5)
WBC # BLD AUTO: 4.8 THOUSAND/UL (ref 3.8–10.8)

## 2025-09-05 DIAGNOSIS — E55.9 VITAMIN D DEFICIENCY: Primary | ICD-10-CM

## 2025-09-05 DIAGNOSIS — Z00.00 PREVENTATIVE HEALTH CARE: ICD-10-CM

## 2025-09-05 RX ORDER — ERGOCALCIFEROL 1.25 MG/1
1.25 CAPSULE ORAL
Qty: 12 CAPSULE | Refills: 2 | Status: SHIPPED | OUTPATIENT
Start: 2025-09-07

## 2025-09-24 ENCOUNTER — APPOINTMENT (OUTPATIENT)
Dept: OPHTHALMOLOGY | Facility: CLINIC | Age: 70
End: 2025-09-24
Payer: COMMERCIAL

## 2025-09-30 ENCOUNTER — APPOINTMENT (OUTPATIENT)
Dept: OPHTHALMOLOGY | Facility: CLINIC | Age: 70
End: 2025-09-30
Payer: COMMERCIAL

## 2025-10-09 ENCOUNTER — APPOINTMENT (OUTPATIENT)
Dept: PRIMARY CARE | Facility: CLINIC | Age: 70
End: 2025-10-09
Payer: COMMERCIAL

## 2025-11-19 ENCOUNTER — APPOINTMENT (OUTPATIENT)
Dept: OPHTHALMOLOGY | Facility: CLINIC | Age: 70
End: 2025-11-19
Payer: COMMERCIAL

## (undated) DEVICE — GLOVE, SURGICAL, PROTEXIS PI BLUE W/NEUTHERA, 8.0, PF, LF

## (undated) DEVICE — Device

## (undated) DEVICE — TROCAR SYSTEM, BALLOON, KII GELPORT, 12 X 100MM

## (undated) DEVICE — CANNULA, KII ADVANCED FIXATION, 5X100MM W/SEAL

## (undated) DEVICE — SUTURE, VICRYL PLUS, 0, 27IN, UR-6, VIOLET, BRAIDED

## (undated) DEVICE — SUTURE, VICRYL, 0, 27 IN, UR-6, VIOLET

## (undated) DEVICE — LIGASURE, V SEALER/DIVIDER  5MM BLUNT TIP

## (undated) DEVICE — ADHESIVE, SKIN, LIQUIBAND EXCEED

## (undated) DEVICE — PUMP, STRYKERFLOW 2 & HANDPIECE W/10FT. IRRIGATION TUBING

## (undated) DEVICE — RETRIEVAL SYSTEM, MONARCH, 10MM DISP ENDOSCOPIC

## (undated) DEVICE — DRAIN, CHANNEL, HUBLESS, ROUND, FULL FLUTE, 19FR

## (undated) DEVICE — STAPLER, EF POWERED PLUS, CUTTER 340MM, 45MM EFFECTOR, DISP

## (undated) DEVICE — TUBE SET, PNEUMOLAR HEATED, SMOKE EVACU, HIGH-FLOW

## (undated) DEVICE — DRAPE, SHEET, UTILITY, NON ABSORBENT, 18 X 26 IN, LF

## (undated) DEVICE — APPLICATOR, ENDOSCOPIC, F/SURGICEL POWDER

## (undated) DEVICE — EVACUATOR, WOUND, SUCTION, CLOSED, JACKSON-PRATT, 100 CC, SILICONE

## (undated) DEVICE — STAPLER,  ENDO ECHELON 45MM RELOAD, GREEN, REUSABLE

## (undated) DEVICE — CARE KIT, LAPAROSCOPIC, ADVANCED

## (undated) DEVICE — TROCAR, OPTICAL BLADELESS 5MM X 100 W/ADVANCED FIXATION

## (undated) DEVICE — HEMOSTAT, SURGICEL POWDER 3.0GRAMS

## (undated) DEVICE — NEEDLE, HYPODERMIC, MONOJECT, 25 G X 1.5 IN, LUER LOCK HUB, RED

## (undated) DEVICE — NEEDLE, INSUFFLATION, 13GAX120MM, DISP

## (undated) DEVICE — GLOVE, SURGICAL, PROTEXIS PI MICRO, 6.5, PF, LF

## (undated) DEVICE — ELECTRODE, ELECTROSURGICAL, BLADE, INSULATED, ENT/IMA, STERILE

## (undated) DEVICE — SUTURE, ETHILON, 2-0, 18 IN, FS, BLACK, BX/12

## (undated) DEVICE — SUTURE, MONOCRYL, 4-0, 18 IN, PS2, UNDYED

## (undated) DEVICE — SYRINGE, 20 CC, LUER LOCK

## (undated) DEVICE — SYRINGE, 30 CC, LUER LOCK

## (undated) DEVICE — GLOVE, SURGICAL, PROTEXIS PI MICRO, 7.5, PF, LF

## (undated) DEVICE — CORD, MONOPOLARD, 10FT, DISP